# Patient Record
Sex: FEMALE | Race: WHITE | NOT HISPANIC OR LATINO | Employment: OTHER | URBAN - NONMETROPOLITAN AREA
[De-identification: names, ages, dates, MRNs, and addresses within clinical notes are randomized per-mention and may not be internally consistent; named-entity substitution may affect disease eponyms.]

---

## 2019-06-03 ENCOUNTER — OFFICE VISIT (OUTPATIENT)
Dept: CARDIOLOGY | Facility: CLINIC | Age: 79
End: 2019-06-03

## 2019-06-03 VITALS
HEIGHT: 63 IN | BODY MASS INDEX: 33.63 KG/M2 | DIASTOLIC BLOOD PRESSURE: 71 MMHG | HEART RATE: 68 BPM | OXYGEN SATURATION: 95 % | WEIGHT: 189.8 LBS | SYSTOLIC BLOOD PRESSURE: 139 MMHG

## 2019-06-03 DIAGNOSIS — I25.10 CORONARY ARTERY DISEASE INVOLVING NATIVE CORONARY ARTERY OF NATIVE HEART WITHOUT ANGINA PECTORIS: Primary | ICD-10-CM

## 2019-06-03 DIAGNOSIS — R00.2 PALPITATIONS: ICD-10-CM

## 2019-06-03 DIAGNOSIS — I42.0 CARDIOMYOPATHY, DILATED (HCC): ICD-10-CM

## 2019-06-03 DIAGNOSIS — R42 DIZZINESS: ICD-10-CM

## 2019-06-03 DIAGNOSIS — R06.02 SHORTNESS OF BREATH: ICD-10-CM

## 2019-06-03 DIAGNOSIS — E78.5 HYPERLIPIDEMIA, UNSPECIFIED HYPERLIPIDEMIA TYPE: ICD-10-CM

## 2019-06-03 DIAGNOSIS — I10 ESSENTIAL HYPERTENSION: ICD-10-CM

## 2019-06-03 PROBLEM — M19.90 ARTHRITIS: Status: ACTIVE | Noted: 2019-06-03

## 2019-06-03 PROBLEM — H40.9 GLAUCOMA: Status: ACTIVE | Noted: 2019-06-03

## 2019-06-03 PROCEDURE — 99204 OFFICE O/P NEW MOD 45 MIN: CPT | Performed by: NURSE PRACTITIONER

## 2019-06-03 PROCEDURE — 93000 ELECTROCARDIOGRAM COMPLETE: CPT | Performed by: NURSE PRACTITIONER

## 2019-06-03 RX ORDER — LATANOPROST 50 UG/ML
SOLUTION/ DROPS OPHTHALMIC TAKE AS DIRECTED
COMMUNITY
Start: 2019-03-17

## 2019-06-03 RX ORDER — ATORVASTATIN CALCIUM 40 MG/1
40 TABLET, FILM COATED ORAL DAILY
COMMUNITY
Start: 2019-04-08

## 2019-06-03 RX ORDER — LISINOPRIL 20 MG/1
20 TABLET ORAL DAILY
COMMUNITY
Start: 2019-05-28 | End: 2021-04-27 | Stop reason: ALTCHOICE

## 2019-06-03 RX ORDER — METOPROLOL SUCCINATE 50 MG/1
50 TABLET, EXTENDED RELEASE ORAL DAILY
COMMUNITY
Start: 2019-03-22 | End: 2020-02-10 | Stop reason: SDUPTHER

## 2019-06-03 RX ORDER — FUROSEMIDE 20 MG/1
20 TABLET ORAL DAILY
COMMUNITY
Start: 2019-05-06

## 2019-06-03 RX ORDER — ASPIRIN 81 MG/1
81 TABLET ORAL DAILY
COMMUNITY

## 2019-06-03 RX ORDER — VITAMIN B COMPLEX
CAPSULE ORAL DAILY
COMMUNITY

## 2019-06-03 NOTE — PROGRESS NOTES
Subjective   Kate Macias is a 79 y.o. female     Chief Complaint   Patient presents with   • Establish Care     Pt has hx of HTN and hyperlipidemia    • Hypertension       HPI    Problem list:    1.  CAD  1.1 left heart cath 4/2017 LVEF 20 to 25%, 60% napkin ring stenosis of the left circumflex  2.  Cardiomyopathy  2.1 echo 3/24/17-mitral regurgitation, moderate to severe, EF 20 to 25%  2.2 echo 10/16/18-LVEF 45 to 50%, mildly increased with severe left ventricular diastolic dysfunction, global hypokinesis, left atrium is mildly dilated, moderate posterior annular calcification the mitral valve, mild mitral regurgitation, RVSP is 20 mmHg, pericardial effusion of small size  3.  Hypertension  4.  Hyperlipidemia  5.  Palpitations  6.  Shortness of breath  7.  Arthritis  8.  Glaucoma  9.  Early family history of coronary artery disease    Patient is a 79-year-old female who presents today to establish care with her  at her side.  They moved here from [place] in January.  She denies any chest pain or pressure.  She says she does feel her heart race at times.  She says mostly whenever she exerts herself significantly.  She says that she does have dizziness/lightheadedness when she looks up also from position change.  She says she thinks is her neck issues that cause some of this.  Patient had a episode of syncope back in 2017 she does not know why she passed out but she was unconscious for 4 days when she came to they moved her from ICU and then she had to go to nursing home for a while.  This is when patient found that she had cardiomyopathy which is most likely she had a syncopal episode to possible V. tach or V. fib but I do not have any records to confirm this.  She has not had any further episodes.  She denies any orthopnea, PND or edema.  She says that she does have shortness of breath with exertion but again it is more than normal activity such as going up stairs or walking inclines.    Occupation:  Retired  at [employer]  Patient quit smoking when he 3 years old and smoked less than a pack a day for 7 to 8 years; no alcohol or illicit drugs  Occasionally drinks a diet Coke; 2 cups of coffee per day generally decaf, occasional ice tea    Mom 90 -COPD, legally blind due to macular degeneration, smoker  Dad 50 -car accident  Sister 81 alive-permanent pacemaker, borderline diabetes  Brother  before 50 due to MI  Brother 40 -degenerative disc disease and OD on drugs    Current Outpatient Medications   Medication Sig Dispense Refill   • aspirin 81 MG EC tablet Take 81 mg by mouth Daily.     • atorvastatin (LIPITOR) 40 MG tablet Take 40 mg by mouth Daily.     • B Complex Vitamins (VITAMIN B COMPLEX) capsule capsule Take  by mouth Daily.     • Calcium-Magnesium-Vitamin D (CITRACAL CALCIUM+D PO) Take  by mouth.     • Fexofenadine HCl (ALLEGRA ALLERGY PO) Take  by mouth.     • furosemide (LASIX) 20 MG tablet Take 20 mg by mouth Daily.     • latanoprost (XALATAN) 0.005 % ophthalmic solution Take As Directed. 1 drop in each eye, per day     • lisinopril (PRINIVIL,ZESTRIL) 20 MG tablet Take 20 mg by mouth Daily.     • MELATONIN PO Take  by mouth.     • metoprolol succinate XL (TOPROL-XL) 50 MG 24 hr tablet Take 50 mg by mouth Daily.     • Multiple Vitamins-Minerals (MULTIVITAMIN WOMEN 50+ PO) Take  by mouth.       No current facility-administered medications for this visit.        ALLERGIES    Patient has no known allergies.    Past Medical History:   Diagnosis Date   • Arthritis    • CHF (congestive heart failure) (CMS/[facility])    • DDD (degenerative disc disease), cervical    • DDD (degenerative disc disease), lumbar    • DDD (degenerative disc disease), thoracic    • Ejection fraction < 50%    • Enlarged heart    • Glaucoma    • Hyperlipidemia    • Hypertension    • Left ventricular hypertrophy due to hypertensive disease    • Neck pain      reverse curvature of spine in neck        Social History     Socioeconomic History   • Marital status:      Spouse name: Not on file   • Number of children: Not on file   • Years of education: Not on file   • Highest education level: Not on file   Tobacco Use   • Smoking status: Former Smoker     Packs/day: 0.50     Years: 15.00     Pack years: 7.50     Types: Cigarettes   • Smokeless tobacco: Never Used   Substance and Sexual Activity   • Alcohol use: No     Frequency: Never   • Drug use: No   • Sexual activity: Defer       Family History   Problem Relation Age of Onset   • COPD Mother    • Macular degeneration Mother    • Blindness Mother    • No Known Problems Father    • Heart attack Brother        Review of Systems   Constitutional: Positive for fatigue (States she's more tired now that she's older, but still climbs ladders and is active ). Negative for diaphoresis.   HENT: Positive for postnasal drip (States she has drainage all the time ). Negative for congestion, rhinorrhea and sore throat.    Eyes: Positive for visual disturbance (glasses daily ).   Respiratory: Positive for shortness of breath (Pt states she gets SoA w/ exertion and inclines ). Negative for chest tightness.    Cardiovascular: Positive for palpitations (will just feel it racing ). Negative for chest pain (Denies CP ) and leg swelling.   Gastrointestinal: Negative for abdominal pain, blood in stool, constipation, diarrhea, nausea and vomiting.   Endocrine: Negative for cold intolerance and heat intolerance.        Pt c/o hot flashes at night    Genitourinary: Positive for frequency (w/ water pill ), hematuria (States that blood can be found in her urine with a microscope. States no one knows where it's coming from and it's been there for a long time. ) and urgency (w/ water pill ). Negative for difficulty urinating and dysuria.   Musculoskeletal: Positive for arthralgias, back pain (DDD) and neck pain (reverse curvature of spine in  "neck ).   Skin: Positive for rash (getting over poison ivy). Negative for wound.   Allergic/Immunologic: Positive for environmental allergies (pollen ). Negative for food allergies.   Neurological: Positive for dizziness (look up and if she bends over and gets up quickly ), syncope (Feb 2018, she blacked out for 3-4 days. States she blacked out and hit her head on a washing machine. No episodes of blacking out since then; never figured out why she passed out) and light-headedness (occasionally. States if she looks up she gets light headed. States she thinsk it's from her neck issues ). Negative for weakness, numbness and headaches.   Hematological: Bruises/bleeds easily (bruises).   Psychiatric/Behavioral: Positive for sleep disturbance (Pt states she's up and down all night long. States it usually takes 2 melatonin to \"eventually\" get her to sleep. ).       Objective   /71   Pulse 68   Ht 160 cm (63\")   Wt 86.1 kg (189 lb 12.8 oz)   SpO2 95%   BMI 33.62 kg/m²   Vitals:    06/03/19 1514   BP: 139/71   Pulse: 68   SpO2: 95%   Weight: 86.1 kg (189 lb 12.8 oz)   Height: 160 cm (63\")      Lab Results (most recent)     None        Physical Exam   Constitutional: She is oriented to person, place, and time. Vital signs are normal. She appears well-developed and well-nourished. She is active and cooperative.   HENT:   Head: Normocephalic.   Eyes: Lids are normal.   Wears glasses   Neck: Normal carotid pulses, no hepatojugular reflux and no JVD present. Carotid bruit is not present.   Cardiovascular: Normal rate, regular rhythm and normal heart sounds.   Pulses:       Radial pulses are 2+ on the right side, and 2+ on the left side.        Dorsalis pedis pulses are 2+ on the right side, and 2+ on the left side.        Posterior tibial pulses are 2+ on the right side, and 2+ on the left side.   No edema bilateral lower extremities   Pulmonary/Chest: Effort normal and breath sounds normal.   Abdominal: Normal " appearance and bowel sounds are normal.   Neurological: She is alert and oriented to person, place, and time.   Skin: Skin is warm, dry and intact.   Psychiatric: She has a normal mood and affect. Her speech is normal and behavior is normal. Judgment and thought content normal. Cognition and memory are normal.       Procedure     ECG 12 Lead  Date/Time: 6/3/2019 3:40 PM  Performed by: Susan Rankin APRN  Authorized by: Susan Rankin APRN   Comparison: not compared with previous ECG   Rhythm: sinus rhythm  Rate: normal  BPM: 67  Conduction: non-specific intraventricular conduction delay  QRS axis: normal    Clinical impression: non-specific ECG                 Assessment/Plan      Diagnosis Plan   1. Coronary artery disease involving native coronary artery of native heart without angina pectoris  ECG 12 Lead    Adult Transthoracic Echo Complete W/ Cont if Necessary Per Protocol   2. Essential hypertension  ECG 12 Lead    Adult Transthoracic Echo Complete W/ Cont if Necessary Per Protocol   3. Hyperlipidemia, unspecified hyperlipidemia type     4. Palpitations  ECG 12 Lead    Adult Transthoracic Echo Complete W/ Cont if Necessary Per Protocol   5. Shortness of breath  Adult Transthoracic Echo Complete W/ Cont if Necessary Per Protocol   6. Cardiomyopathy, dilated (CMS/HCC)  Adult Transthoracic Echo Complete W/ Cont if Necessary Per Protocol   7. Dizziness  Duplex Carotid Ultrasound CAR       Return in about 12 weeks (around 8/26/2019).       CAD/hypertension/hyperlipidemia/palpitation/shortness of breath/cardiomyopathy-we will repeat echocardiogram.  Dizziness-I will also get a carotid artery ultrasound.  She will continue her medication regimen for now.  She will follow-up in 12 weeks or sooner if any changes.      Patient's Body mass index is 33.62 kg/m². BMI is above normal parameters. Recommendations include: educational material.      Electronically signed by:

## 2019-06-03 NOTE — PATIENT INSTRUCTIONS
MyPlate from USDA  MyPlate is an outline of a general healthy diet based on the 2010 Dietary Guidelines for Americans, from the U.S. Department of Agriculture (USDA). It sets guidelines for how much food you should eat from each food group based on your age, sex, and level of physical activity.  What are tips for following MyPlate?  To follow MyPlate recommendations:  · Eat a wide variety of fruits and vegetables, grains, and protein foods.  · Serve smaller portions and eat less food throughout the day.  · Limit portion sizes to avoid overeating.  · Enjoy your food.  · Get at least 150 minutes of exercise every week. This is about 30 minutes each day, 5 or more days per week.    It can be difficult to have every meal look like MyPlate. Think about MyPlate as eating guidelines for an entire day, rather than each individual meal.  Fruits and Vegetables  · Make half of your plate fruits and vegetables.  · Eat many different colors of fruits and vegetables each day.  · For a 2,000 calorie daily food plan, eat:  ? 2½ cups of vegetables every day.  ? 2 cups of fruit every day.  · 1 cup is equal to:  ? 1 cup raw or cooked vegetables.  ? 1 cup raw fruit.  ? 1 medium-sized orange, apple, or banana.  ? 1 cup 100% fruit or vegetable juice.  ? 2 cups raw leafy greens, such as lettuce, spinach, or kale.  ? ½ cup dried fruit.  Grains  · One fourth of your plate should be grains.  · Make at least half of the grains you eat each day whole grains.  · For a 2,000 calorie daily food plan, eat 6 oz of grains every day.  · 1 oz is equal to:  ? 1 slice bread.  ? 1 cup cereal.  ? ½ cup cooked rice, cereal, or pasta.  Protein  · One fourth of your plate should be protein.  · Eat a wide variety of protein foods, including meat, poultry, fish, eggs, beans, nuts, and tofu.  · For a 2,000 calorie daily food plan, eat 5½ oz of protein every day.  · 1 oz is equal to:  ? 1 oz meat, poultry, or fish.  ? ¼ cup cooked beans.  ? 1 egg.  ? ½ oz nuts  or seeds.  ? 1 Tbsp peanut butter.  Dairy  · Drink fat-free or low-fat (1%) milk.  · Eat or drink dairy as a side to meals.  · For a 2,000 calorie daily food plan, eat or drink 3 cups of dairy every day.  · 1 cup is equal to:  ? 1 cup milk, yogurt, cottage cheese, or soy milk (soy beverage).  ? 2 oz processed cheese.  ? 1½ oz natural cheese.  Fats, oils, salt, and sugars  · Only small amounts of oils are recommended.  · Avoid foods that are high in calories and low in nutritional value (empty calories), like foods high in fat or added sugars.  · Choose foods that are low in salt (sodium). Choose foods that have less than 140 milligrams (mg) of sodium per serving.  · Drink water instead of sugary drinks. Drink enough water each day to keep your urine pale yellow.  Where to find support  · Work with your health care provider or a nutrition specialist (dietitian) to develop a customized eating plan that is right for you.  · Download an hugh (mobile application) to help you track your daily food intake.  Where to find more information  · Go to ChooseMyPlate.gov for more information.  · Learn more and log your daily food intake according to MyPlate using USDA's Mytonomycker: www.Ti-[employer] Technologyer.usda.gov  Summary  · MyPlate is a general guideline for healthy eating from the USDA. It is based on the 2010 Dietary Guidelines for Americans.  · In general, fruits and vegetables should take up ½ of your plate, grains should take up ¼ of your plate, and protein should take up ¼ of your plate.  This information is not intended to replace advice given to you by your health care provider. Make sure you discuss any questions you have with your health care provider.  Document Released: 01/06/2009 Document Revised: 03/19/2018 Document Reviewed: 03/19/2018  [employer] Interactive Patient Education © 2019 [employer]  Cardiomyopathy, Adult  Cardiomyopathy is a long-term (chronic) disease of the heart muscle. The disease makes the heart  muscle thick, weak, or stiff. As a result, the heart works harder to pump blood. Over time cardiomyopathy can lead to an irregular heartbeat (arrhythmia) and heart failure.  There are several types of cardiomyopathy:  · Dilated cardiomyopathy. This type causes the ventricles to become weak and stretched out.  · Hypertrophic cardiomyopathy. This type causes the heart muscle to thicken.  · Restrictive cardiomyopathy. This type causes the heart muscle to become stiff.  · Ischemic cardiomyopathy. This type involves narrowing arteries that cause the walls of the heart to get thinner.  · Peripartum cardiomyopathy. This type occurs during pregnancy or shortly after pregnancy.    What are the causes?  This condition may be caused by:  · A gene that is passed down (inherited) from a family member.  · A medical condition that damages the heart, such as:  ? Diabetes.  ? High blood pressure.  ? Viral infection of the heart.  ? Heart attack.  ? Coronary heart disease.  · Alcoholism.  · Using illegal drugs or some prescription medicines.  · Pregnancy.  · Your body absorbing and storing too much iron (hemochromatosis).  · Autoimmune diseases, connective tissue diseases, endocrine diseases, and muscle diseases.  · Cancer treatments.  · Buildup of proteins in your organs (amyloidosis), or inflammation in your organs (sarcoidosis).    Often, the cause is not known.  What increases the risk?  This condition is more likely to develop in people who:  · Have a family history of cardiomyopathy or other heart problems.  · Are overweight or obese.  · Use illegal drugs.  · Abuse alcohol.  · Have a medical condition that damages the heart.    What are the signs or symptoms?  Symptoms of this condition include:  · Shortness of breath, especially during activity.  · Fatigue.  · An irregular heartbeat and heart murmurs.  · Dizziness.  · Light-headedness.  · Fainting.  · Chest pain.  · Coughing.  · Swelling in the lower legs, ankles, feet,  abdomen, and neck veins.    Often, people with this condition have no symptoms.  How is this diagnosed?  This condition is diagnosed based on:  · Your symptoms and medical history.  · A physical exam.  · Tests.    Tests may include:  · Blood tests.  · Imaging studies of your heart, such as:  ? X-rays.  ? An echocardiogram.  ? An MRI.  · An electrocardiogram (ECG). This records your heart's electrical activity.  · A test in which you wear a portable device (event monitor) to record your heart's electrical activity while you go about your day.  · A stress test. This monitors your heart's activity while exercising.  · Cardiac catheterization. This procedure checks the blood pressure and blood flow in your heart.  · An angiogram. This is an injection of dye into your arteries before imaging studies are taken.  · Heart tissue biopsy. This removes a sample of heart tissue for examination.    How is this treated?  Treatment for this condition depends on the type of cardiomyopathy you have and the severity of your symptoms. If you do not have symptoms, you may not need treatment. If you need treatment, it may include:  · Lifestyle changes, such as:  ? Eating a heart-healthy diet that includes plenty of fruits, vegetables, and whole grains, and cutting down on salt (sodium).  ? Maintaining a healthy weight, and losing weight, if needed.  ? Getting regular exercise.  ? Quitting smoking, if you smoke.  ? Avoiding alcohol.  ? Medicine to:  § Lower your blood pressure.  § Slow down your heart rate.  § Keep your heart beating in a steady rhythm.  § Clear excess fluids from your body.  § Prevent blood clots.  § Balance minerals (electrolytes) in your body and get rid of extra sodium in your body.  § Reduce inflammation.  § Strengthen your heartbeat.  ? Surgery to:  § Repair a defect.  § Remove thickened tissue.  § Destroy tissues in the area of abnormal electrical activity (ablation).  § Implant a device to treat serious heart  rhythm problems (implantable cardioverter-defibrillator, or ICD), or a pacemaker.  § Replace your heart (heart transplant) if all other treatments have failed (end stage).    Other treatments may include cardiac resynchronization therapy (CRT) or a left ventricular assist device (LVAD).  Follow these instructions at home:  Lifestyle  · Eat a heart-healthy diet. Work with your health care provider or a registered dietitian to learn about healthy eating options.  · Maintain a healthy weight.  · Stay physically active. Ask your health care provider to suggest some activities that are good for you.  · Do not use any products that contain nicotine or tobacco, such as cigarettes and e-cigarettes. If you need help quitting, ask your health care provider.  · Limit alcohol intake to no more than one drink per day for nonpregnant women and no more than two drinks per day for men. One drink equals 12 oz of beer, 5 oz of wine, or 1½ oz of hard liquor.  · Try to get at least 7 hours of sleep each night.  · Find healthy ways to manage stress.  General instructions  · Take over-the-counter and prescription medicines only as told by your health care provider. Some medicines can be dangerous for your heart.  · Tell all health care providers, including your dentist, that you have cardiomyopathy. When you visit the dentist or have surgery, ask your health care provider if you need antibiotics before having dental care or before the surgery.  · Ask your health care provider if you should wear a medical identification bracelet. This may be important if you have a pacemaker or a defibrillator.  · Make sure you get all recommended vaccinations and an annual flu shot.  · Work closely with your health care provider to manage any long-lasting (chronic) conditions.  · Keep all follow-up visits as told by your health care provider. This is important, even if you do not have any symptoms. Your health care provider may need to make sure your  condition is not getting worse.  How is this prevented?  This condition cannot be prevented. Parents, siblings, and children of people with this condition may be at risk for the condition. It is a good idea for them to get screened for the condition because it is best when cardiomyopathy is found early. Screening is done with an ECG and echocardiogram.  People who want to start a family may also want to meet with a genetic counselor to discuss the risk of having a child with cardiomyopathy.  Contact a health care provider if:  · Your symptoms get worse.  · You have new symptoms.  Get help right away if:  · You have severe chest pain.  · You have shortness of breath.  · You cough up pink, bubbly material.  · You have sudden sweating.  · You feel nauseous and you vomit.  · You suddenly become light-headed or dizzy.  · You feel your heart beating very quickly.  · It feels like your heart is skipping beats.  These symptoms may represent a serious problem that is an emergency. Do not wait to see if the symptoms will go away. Get medical help right away. Call your local emergency services (911 in the [place] Do not drive yourself to the hospital.  Summary  · Cardiomyopathy is a long-term (chronic) disease of the heart muscle.  · Over time cardiomyopathy can lead to an irregular heartbeat (arrhythmia) and heart failure.  This information is not intended to replace advice given to you by your health care provider. Make sure you discuss any questions you have with your health care provider.  Document Released: 03/02/2006 Document Revised: 03/09/2018 Document Reviewed: 06/19/2017  [employer] Interactive Patient Education © 2019 [employer]

## 2019-06-04 ENCOUNTER — TELEPHONE (OUTPATIENT)
Dept: CARDIOLOGY | Facility: CLINIC | Age: 79
End: 2019-06-04

## 2019-06-04 NOTE — TELEPHONE ENCOUNTER
----- Message from BRISA Valdivia sent at 6/3/2019  5:46 PM EDT -----  Can you call [facility] [place] and [facility] records from 2017 to current.  Thanks!

## 2019-06-06 NOTE — TELEPHONE ENCOUNTER
Called [facility] MR dept at [phone], was told to fax a request to [phone].       Faxed request for records from 2017- now.       Awaiting fax...

## 2019-06-18 ENCOUNTER — HOSPITAL ENCOUNTER (OUTPATIENT)
Dept: CARDIOLOGY | Facility: HOSPITAL | Age: 79
Discharge: HOME | End: 2019-06-18

## 2019-06-18 ENCOUNTER — HOSPITAL ENCOUNTER (OUTPATIENT)
Dept: CARDIOLOGY | Facility: HOSPITAL | Age: 79
Discharge: HOME | End: 2019-06-18
Admitting: NURSE PRACTITIONER

## 2019-06-18 VITALS — WEIGHT: 189.82 LBS | HEIGHT: 63 IN | BODY MASS INDEX: 33.63 KG/M2

## 2019-06-18 DIAGNOSIS — I42.0 CARDIOMYOPATHY, DILATED (HCC): ICD-10-CM

## 2019-06-18 DIAGNOSIS — R42 DIZZINESS: ICD-10-CM

## 2019-06-18 DIAGNOSIS — R06.02 SHORTNESS OF BREATH: ICD-10-CM

## 2019-06-18 DIAGNOSIS — I25.10 CORONARY ARTERY DISEASE INVOLVING NATIVE CORONARY ARTERY OF NATIVE HEART WITHOUT ANGINA PECTORIS: ICD-10-CM

## 2019-06-18 DIAGNOSIS — R00.2 PALPITATIONS: ICD-10-CM

## 2019-06-18 DIAGNOSIS — I10 ESSENTIAL HYPERTENSION: ICD-10-CM

## 2019-06-18 PROCEDURE — 93306 TTE W/DOPPLER COMPLETE: CPT

## 2019-06-18 PROCEDURE — 93880 EXTRACRANIAL BILAT STUDY: CPT | Performed by: INTERNAL MEDICINE

## 2019-06-18 PROCEDURE — 93880 EXTRACRANIAL BILAT STUDY: CPT

## 2019-06-18 PROCEDURE — 93306 TTE W/DOPPLER COMPLETE: CPT | Performed by: INTERNAL MEDICINE

## 2019-06-24 ENCOUNTER — TELEPHONE (OUTPATIENT)
Dept: CARDIOLOGY | Facility: CLINIC | Age: 79
End: 2019-06-24

## 2019-06-24 LAB
BH CV ECHO MEAS - ACS: 2 CM
BH CV ECHO MEAS - AO MAX PG: 6.5 MMHG
BH CV ECHO MEAS - AO MEAN PG: 3.4 MMHG
BH CV ECHO MEAS - AO ROOT AREA (BSA CORRECTED): 1.5
BH CV ECHO MEAS - AO ROOT AREA: 6.3 CM^2
BH CV ECHO MEAS - AO ROOT DIAM: 2.8 CM
BH CV ECHO MEAS - AO V2 MAX: 127.5 CM/SEC
BH CV ECHO MEAS - AO V2 MEAN: 86.9 CM/SEC
BH CV ECHO MEAS - AO V2 VTI: 28.3 CM
BH CV ECHO MEAS - BSA(HAYCOCK): 2 M^2
BH CV ECHO MEAS - BSA(HAYCOCK): 2 M^2
BH CV ECHO MEAS - BSA: 1.9 M^2
BH CV ECHO MEAS - BSA: 1.9 M^2
BH CV ECHO MEAS - BZI_BMI: 33.5 KILOGRAMS/M^2
BH CV ECHO MEAS - BZI_BMI: 33.5 KILOGRAMS/M^2
BH CV ECHO MEAS - BZI_METRIC_HEIGHT: 160 CM
BH CV ECHO MEAS - BZI_METRIC_HEIGHT: 160 CM
BH CV ECHO MEAS - BZI_METRIC_WEIGHT: 85.7 KG
BH CV ECHO MEAS - BZI_METRIC_WEIGHT: 85.7 KG
BH CV ECHO MEAS - EDV(CUBED): 134.1 ML
BH CV ECHO MEAS - EDV(MOD-SP4): 100.4 ML
BH CV ECHO MEAS - EDV(TEICH): 124.9 ML
BH CV ECHO MEAS - EF(CUBED): 52.8 %
BH CV ECHO MEAS - EF(MOD-SP4): 56.9 %
BH CV ECHO MEAS - EF(TEICH): 44.4 %
BH CV ECHO MEAS - ESV(CUBED): 63.3 ML
BH CV ECHO MEAS - ESV(MOD-SP4): 43.3 ML
BH CV ECHO MEAS - ESV(TEICH): 69.4 ML
BH CV ECHO MEAS - FS: 22.1 %
BH CV ECHO MEAS - IVS/LVPW: 1
BH CV ECHO MEAS - IVSD: 1.2 CM
BH CV ECHO MEAS - LA DIMENSION(2D): 3.8 CM
BH CV ECHO MEAS - LA DIMENSION: 3.6 CM
BH CV ECHO MEAS - LA/AO: 1.3
BH CV ECHO MEAS - LAT PEAK E' VEL: 2 CM/SEC
BH CV ECHO MEAS - LV DIASTOLIC VOL/BSA (35-75): 53.2 ML/M^2
BH CV ECHO MEAS - LV IVRT: 0.11 SEC
BH CV ECHO MEAS - LV MASS(C)D: 234.9 GRAMS
BH CV ECHO MEAS - LV MASS(C)DI: 124.4 GRAMS/M^2
BH CV ECHO MEAS - LV SYSTOLIC VOL/BSA (12-30): 22.9 ML/M^2
BH CV ECHO MEAS - LVIDD: 5.1 CM
BH CV ECHO MEAS - LVIDS: 4 CM
BH CV ECHO MEAS - LVPWD: 1.2 CM
BH CV ECHO MEAS - MED PEAK E' VEL: 2 CM/SEC
BH CV ECHO MEAS - MITRAL HR: 99.7 BPM
BH CV ECHO MEAS - MITRAL R-R: 0.6 SEC
BH CV ECHO MEAS - MR MAX PG: 79.9 MMHG
BH CV ECHO MEAS - MR MAX VEL: 438.4 CM/SEC
BH CV ECHO MEAS - MV A MAX VEL: 114.6 CM/SEC
BH CV ECHO MEAS - MV DEC SLOPE: 228.6 CM/SEC^2
BH CV ECHO MEAS - MV DEC TIME: 0.27 SEC
BH CV ECHO MEAS - MV E MAX VEL: 61.1 CM/SEC
BH CV ECHO MEAS - MV E/A: 0.53
BH CV ECHO MEAS - MV MAX PG: 5.2 MMHG
BH CV ECHO MEAS - MV MEAN PG: 2.1 MMHG
BH CV ECHO MEAS - MV P1/2T: 83 MSEC
BH CV ECHO MEAS - MV V2 MAX: 113.6 CM/SEC
BH CV ECHO MEAS - MV V2 MEAN: 66.4 CM/SEC
BH CV ECHO MEAS - MV V2 VTI: 39.5 CM
BH CV ECHO MEAS - MVA(P1/2T): 2.6 CM2
BH CV ECHO MEAS - PA MAX PG: 3.8 MMHG
BH CV ECHO MEAS - PA MEAN PG: 1.9 MMHG
BH CV ECHO MEAS - PA V2 MAX: 97.1 CM/SEC
BH CV ECHO MEAS - PA V2 MEAN: 63.2 CM/SEC
BH CV ECHO MEAS - PA V2 VTI: 21.2 CM
BH CV ECHO MEAS - PULM. HR: 178.8 BPM
BH CV ECHO MEAS - PULM. R-R: 0.34 SEC
BH CV ECHO MEAS - RAP SYSTOLE: 10 MMHG
BH CV ECHO MEAS - RVDD: 2.6 CM
BH CV ECHO MEAS - RVSP: 23 MMHG
BH CV ECHO MEAS - SI(AO): 94.5 ML/M^2
BH CV ECHO MEAS - SI(CUBED): 37.5 ML/M^2
BH CV ECHO MEAS - SI(MOD-SP4): 30.2 ML/M^2
BH CV ECHO MEAS - SI(TEICH): 29.4 ML/M^2
BH CV ECHO MEAS - SV(AO): 178.3 ML
BH CV ECHO MEAS - SV(CUBED): 70.8 ML
BH CV ECHO MEAS - SV(MOD-SP4): 57.1 ML
BH CV ECHO MEAS - SV(TEICH): 55.4 ML
BH CV ECHO MEAS - TR MAX VEL: 179.8 CM/SEC
BH CV ECHO MEASUREMENTS AVERAGE E/E' RATIO: 30.55
BH CV XLRA MEAS LEFT BULB EDV: 18.7 CM/SEC
BH CV XLRA MEAS LEFT BULB PSV: 82 CM/SEC
BH CV XLRA MEAS LEFT CCA RATIO VEL: 66.3 CM/SEC
BH CV XLRA MEAS LEFT DIST CCA EDV: 11.5 CM/SEC
BH CV XLRA MEAS LEFT DIST CCA PSV: 66.3 CM/SEC
BH CV XLRA MEAS LEFT DIST ICA EDV: -31.4 CM/SEC
BH CV XLRA MEAS LEFT DIST ICA PSV: -109 CM/SEC
BH CV XLRA MEAS LEFT ICA RATIO VEL: -109 CM/SEC
BH CV XLRA MEAS LEFT ICA/CCA RATIO: -1.6
BH CV XLRA MEAS LEFT MID CCA EDV: 18.1 CM/SEC
BH CV XLRA MEAS LEFT MID CCA PSV: 89.9 CM/SEC
BH CV XLRA MEAS LEFT MID ICA EDV: -24.7 CM/SEC
BH CV XLRA MEAS LEFT MID ICA PSV: -88.1 CM/SEC
BH CV XLRA MEAS LEFT PROX CCA EDV: 18.7 CM/SEC
BH CV XLRA MEAS LEFT PROX CCA PSV: 99.5 CM/SEC
BH CV XLRA MEAS LEFT PROX ECA EDV: 0 CM/SEC
BH CV XLRA MEAS LEFT PROX ECA PSV: -67.5 CM/SEC
BH CV XLRA MEAS LEFT PROX ICA EDV: 22.3 CM/SEC
BH CV XLRA MEAS LEFT PROX ICA PSV: 79 CM/SEC
BH CV XLRA MEAS LEFT VERTEBRAL A EDV: 13.3 CM/SEC
BH CV XLRA MEAS LEFT VERTEBRAL A PSV: 63.3 CM/SEC
BH CV XLRA MEAS RIGHT BULB EDV: 11 CM/SEC
BH CV XLRA MEAS RIGHT BULB PSV: 55.4 CM/SEC
BH CV XLRA MEAS RIGHT CCA RATIO VEL: 73.9 CM/SEC
BH CV XLRA MEAS RIGHT DIST CCA EDV: 16.3 CM/SEC
BH CV XLRA MEAS RIGHT DIST CCA PSV: 73.9 CM/SEC
BH CV XLRA MEAS RIGHT DIST ICA EDV: 14.3 CM/SEC
BH CV XLRA MEAS RIGHT DIST ICA PSV: 60.6 CM/SEC
BH CV XLRA MEAS RIGHT ICA RATIO VEL: -61.1 CM/SEC
BH CV XLRA MEAS RIGHT ICA/CCA RATIO: -0.83
BH CV XLRA MEAS RIGHT MID CCA EDV: 15.5 CM/SEC
BH CV XLRA MEAS RIGHT MID CCA PSV: 73.9 CM/SEC
BH CV XLRA MEAS RIGHT MID ICA EDV: -12.4 CM/SEC
BH CV XLRA MEAS RIGHT MID ICA PSV: -59.2 CM/SEC
BH CV XLRA MEAS RIGHT PROX CCA EDV: 15.5 CM/SEC
BH CV XLRA MEAS RIGHT PROX CCA PSV: 82.5 CM/SEC
BH CV XLRA MEAS RIGHT PROX ECA EDV: 0 CM/SEC
BH CV XLRA MEAS RIGHT PROX ECA PSV: -72.1 CM/SEC
BH CV XLRA MEAS RIGHT PROX ICA EDV: -13.8 CM/SEC
BH CV XLRA MEAS RIGHT PROX ICA PSV: -61.1 CM/SEC
BH CV XLRA MEAS RIGHT VERTEBRAL A EDV: 8.1 CM/SEC
BH CV XLRA MEAS RIGHT VERTEBRAL A PSV: 45.8 CM/SEC
MAXIMAL PREDICTED HEART RATE: 141 BPM
STRESS TARGET HR: 120 BPM

## 2019-06-24 NOTE — TELEPHONE ENCOUNTER
----- Message from BRISA Valdivia sent at 6/24/2019  7:02 AM EDT -----  Patient's EF has gone down slightly, can just be visual or truly declined.  Would she want to have a stress test to see if any possible ischemia for slightly decreased EF?  She is on appropriate meds.

## 2019-06-24 NOTE — TELEPHONE ENCOUNTER
Informed pt of her results, she stated that her EF use to be around 20%, so it's much better. I informed pt that her last echo showed EF of 45-50%, so it seems it may have gone down some.   Pt stated that she would think about it and call back.

## 2019-06-24 NOTE — TELEPHONE ENCOUNTER
Echo:  Estimated EF appears to be in the range of 41 - 45%.       Duplex Carotid:  1.  Both common carotid arteries are widely patent.  2.  There is mild bifurcation disease bilaterally, somewhat more pronounced on the left where mild eccentric calcified plaque is identified, with 16 to 49% stenosis bilaterally.     3.  The right internal carotid artery is without obstruction by echo or Doppler sampling.  16 to 49% stenosis in the mid and distal left internal carotid artery.     4.  Antegrade flow is demonstrated in both vertebral arteries.     Summary: Nonobstructive carotid disease bilaterally as above.  Antegrade flow in both vertebral arteries.      Follow up on 9/11/19.

## 2019-09-11 ENCOUNTER — OFFICE VISIT (OUTPATIENT)
Dept: CARDIOLOGY | Facility: CLINIC | Age: 79
End: 2019-09-11

## 2019-09-11 VITALS
SYSTOLIC BLOOD PRESSURE: 140 MMHG | HEIGHT: 63 IN | OXYGEN SATURATION: 97 % | BODY MASS INDEX: 33.91 KG/M2 | WEIGHT: 191.4 LBS | HEART RATE: 67 BPM | DIASTOLIC BLOOD PRESSURE: 71 MMHG

## 2019-09-11 DIAGNOSIS — E78.5 HYPERLIPIDEMIA, UNSPECIFIED HYPERLIPIDEMIA TYPE: ICD-10-CM

## 2019-09-11 DIAGNOSIS — I10 ESSENTIAL HYPERTENSION: ICD-10-CM

## 2019-09-11 DIAGNOSIS — I65.23 BILATERAL CAROTID ARTERY STENOSIS: ICD-10-CM

## 2019-09-11 DIAGNOSIS — R06.02 SHORTNESS OF BREATH: ICD-10-CM

## 2019-09-11 DIAGNOSIS — I25.10 CORONARY ARTERY DISEASE INVOLVING NATIVE CORONARY ARTERY OF NATIVE HEART WITHOUT ANGINA PECTORIS: Primary | ICD-10-CM

## 2019-09-11 DIAGNOSIS — R00.2 PALPITATIONS: ICD-10-CM

## 2019-09-11 DIAGNOSIS — I42.0 CARDIOMYOPATHY, DILATED (HCC): ICD-10-CM

## 2019-09-11 PROCEDURE — 99214 OFFICE O/P EST MOD 30 MIN: CPT | Performed by: NURSE PRACTITIONER

## 2019-09-11 RX ORDER — POTASSIUM CHLORIDE 750 MG/1
1 TABLET, FILM COATED, EXTENDED RELEASE ORAL DAILY
Refills: 1 | COMMUNITY
Start: 2019-08-06

## 2019-09-11 NOTE — PATIENT INSTRUCTIONS
"Fat and Cholesterol Restricted Eating Plan  Getting too much fat and cholesterol in your diet may cause health problems. Choosing the right foods helps keep your fat and cholesterol at normal levels. This can keep you from getting certain diseases.  Your doctor may recommend an eating plan that includes:  · Total fat: ______% or less of total calories a day.  · Saturated fat: ______% or less of total calories a day.  · Cholesterol: less than _________mg a day.  · Fiber: ______g a day.  What are tips for following this plan?  General tips    · Work with your doctor to lose weight if you need to.  · Avoid:  ? Foods with added sugar.  ? Fried foods.  ? Foods with partially hydrogenated oils.  · Limit alcohol intake to no more than 1 drink a day for nonpregnant women and 2 drinks a day for men. One drink equals 12 oz of beer, 5 oz of wine, or 1½ oz of hard liquor.  Reading food labels  · Check food labels for:  ? Trans fats.  ? Partially hydrogenated oils.  ? Saturated fat (g) in each serving.  ? Cholesterol (mg) in each serving.  ? Fiber (g) in each serving.  · Choose foods with healthy fats, such as:  ? Monounsaturated fats.  ? Polyunsaturated fats.  ? Omega-3 fats.  · Choose grain products that have whole grains. Look for the word \"whole\" as the first word in the ingredient list.  Cooking  · Cook foods using low-fat methods. These include baking, boiling, grilling, and broiling.  · Eat more home-cooked foods. Eat at restaurants and buffets less often.  · Avoid cooking using saturated fats, such as butter, cream, palm oil, palm kernel oil, and coconut oil.  Meal planning    · At meals, divide your plate into four equal parts:  ? Fill one-half of your plate with vegetables and green salads.  ? Fill one-fourth of your plate with whole grains.  ? Fill one-fourth of your plate with low-fat (lean) protein foods.  · Eat fish that is high in omega-3 fats at least two times a week. This includes mackerel, tuna, sardines, and " salmon.  · Eat foods that are high in fiber, such as whole grains, beans, apples, broccoli, carrots, peas, and barley.  Recommended foods  Grains  · Whole grains, such as whole wheat or whole grain breads, crackers, cereals, and pasta. Unsweetened oatmeal, bulgur, barley, quinoa, or brown rice. Corn or whole wheat flour tortillas.  Vegetables  · Fresh or frozen vegetables (raw, steamed, roasted, or grilled). Green salads.  Fruits  · All fresh, canned (in natural juice), or frozen fruits.  Meats and other protein foods  · Ground beef (85% or leaner), grass-fed beef, or beef trimmed of fat. Skinless chicken or turkey. Ground chicken or turkey. Pork trimmed of fat. All fish and seafood. Egg whites. Dried beans, peas, or lentils. Unsalted nuts or seeds. Unsalted canned beans. Nut butters without added sugar or oil.  Dairy  · Low-fat or nonfat dairy products, such as skim or 1% milk, 2% or reduced-fat cheeses, low-fat and fat-free ricotta or cottage cheese, or plain low-fat and nonfat yogurt.  Fats and oils  · Tub margarine without trans fats. Light or reduced-fat mayonnaise and salad dressings. Avocado. Olive, canola, sesame, or safflower oils.  The items listed above may not be a complete list of recommended foods or beverages. Contact your dietitian for more options.  The items listed above may not be a complete list of foods and beverages [you/your child] can eat. Contact a dietitian for more information.  Foods to avoid  Grains  · White bread. White pasta. White rice. Cornbread. Bagels, pastries, and croissants. Crackers and snack foods that contain trans fat and hydrogenated oils.  Vegetables  · Vegetables cooked in cheese, cream, or butter sauce. Fried vegetables.  Fruits  · Canned fruit in heavy syrup. Fruit in cream or butter sauce. Fried fruit.  Meats and other protein foods  · Fatty cuts of meat. Ribs, chicken wings, hamm, sausage, bologna, salami, chitterlings, fatback, hot dogs, bratwurst, and packaged  lunch meats. Liver and organ meats. Whole eggs and egg yolks. Chicken and turkey with skin. Fried meat.  Dairy  · Whole or 2% milk, cream, half-and-half, and cream cheese. Whole milk cheeses. Whole-fat or sweetened yogurt. Full-fat cheeses. Nondairy creamers and whipped toppings. Processed cheese, cheese spreads, and cheese curds.  Beverages  · Alcohol. Sugar-sweetened drinks such as sodas, lemonade, and fruit drinks.  Fats and oils  · Butter, stick margarine, lard, shortening, ghee, or hamm fat. Coconut, palm kernel, and palm oils.  Sweets and desserts  · Corn syrup, sugars, honey, and molasses. Candy. Jam and jelly. Syrup. Sweetened cereals. Cookies, pies, cakes, donuts, muffins, and ice cream.  The items listed above may not be a complete list of foods and beverages to avoid. Contact your dietitian for more information.  The items listed above may not be a complete list of foods and beverages [you/your child] should avoid. Contact a dietitian for more information.  Summary  · Choosing the right foods helps keep your fat and cholesterol at normal levels. This can keep you from getting certain diseases.  · At meals, fill one-half of your plate with vegetables and green salads.  · Eat high-fiber foods, like whole grains, beans, apples, carrots, peas, and barley.  · Limit added sugar, saturated fats, alcohol, and fried foods.  This information is not intended to replace advice given to you by your health care provider. Make sure you discuss any questions you have with your health care provider.  Document Released: 06/18/2013 Document Revised: 09/04/2018 Document Reviewed: 09/04/2018  [employer] Patient Education © 2019 [employer]  BMI for Adults    Body mass index (BMI) is a number that is calculated from a person's weight and height. BMI may help to estimate how much of a person's weight is composed of fat. BMI can help identify those who may be at higher risk for certain medical problems.  How is BMI  "used with adults?  BMI is used as a screening tool to identify possible weight problems. It is used to check whether a person is obese, overweight, healthy weight, or underweight.  How is BMI calculated?  BMI measures your weight and compares it to your height. This can be done either in English ([place] or metric measurements. Note that charts are available to help you find your BMI quickly and easily without having to do these calculations yourself.  To calculate your BMI in English ([place] measurements, your health care provider will:  1. Measure your weight in pounds (lb).  2. Multiply the number of pounds by 703.  ? For example, for a person who weighs 180 lb, multiply that number by 703, which equals 126,540.  3. Measure your height in inches (in). Then multiply that number by itself to get a measurement called \"inches squared.\"  ? For example, for a person who is 70 in tall, the \"inches squared\" measurement is 70 in x 70 in, which equals 4900 inches squared.  4. Divide the total from Step 2 (number of lb x 703) by the total from Step 3 (inches squared): 126,540 ÷ 4900 = 25.8. This is your BMI.  To calculate your BMI in metric measurements, your health care provider will:  1. Measure your weight in kilograms (kg).  2. Measure your height in meters (m). Then multiply that number by itself to get a measurement called \"meters squared.\"  ? For example, for a person who is 1.75 m tall, the \"meters squared\" measurement is 1.75 m x 1.75 m, which is equal to 3.1 meters squared.  3. Divide the number of kilograms (your weight) by the meters squared number. In this example: 70 ÷ 3.1 = 22.6. This is your BMI.  How is BMI interpreted?  To interpret your results, your health care provider will use BMI charts to identify whether you are underweight, normal weight, overweight, or obese. The following guidelines will be used:  · Underweight: BMI less than 18.5.  · Normal weight: BMI between 18.5 and 24.9.  · Overweight: BMI " between 25 and 29.9.  · Obese: BMI of 30 and above.  Please note:  · Weight includes both fat and muscle, so someone with a muscular build, such as an athlete, may have a BMI that is higher than 24.9. In cases like these, BMI is not an accurate measure of body fat.  · To determine if excess body fat is the cause of a BMI of 25 or higher, further assessments may need to be done by a health care provider.  · BMI is usually interpreted in the same way for men and women.  Why is BMI a useful tool?  BMI is useful in two ways:  · Identifying a weight problem that may be related to a medical condition, or that may increase the risk for medical problems.  · Promoting lifestyle and diet changes in order to reach a healthy weight.  Summary  · Body mass index (BMI) is a number that is calculated from a person's weight and height.  · BMI may help to estimate how much of a person's weight is composed of fat. BMI can help identify those who may be at higher risk for certain medical problems.  · BMI can be measured using English measurements or metric measurements.  · To interpret your results, your health care provider will use BMI charts to identify whether you are underweight, normal weight, overweight, or obese.  This information is not intended to replace advice given to you by your health care provider. Make sure you discuss any questions you have with your health care provider.  Document Released: 08/29/2005 Document Revised: 10/31/2018 Document Reviewed: 10/31/2018  [employer] Interactive Patient Education © 2019 [employer]

## 2020-02-10 RX ORDER — METOPROLOL SUCCINATE 50 MG/1
50 TABLET, EXTENDED RELEASE ORAL DAILY
Qty: 90 TABLET | Refills: 3 | Status: SHIPPED | OUTPATIENT
Start: 2020-02-10 | End: 2021-01-25

## 2020-02-10 NOTE — TELEPHONE ENCOUNTER
Pt LVM stating that she needs Rf of Metoprolol ER 50mg x90 to [facility], stated that it was orgiginally prescribed by a cardiologist in [place].

## 2020-05-21 ENCOUNTER — OFFICE VISIT (OUTPATIENT)
Dept: CARDIOLOGY | Facility: CLINIC | Age: 80
End: 2020-05-21

## 2020-05-21 VITALS
HEART RATE: 70 BPM | SYSTOLIC BLOOD PRESSURE: 137 MMHG | HEIGHT: 63 IN | BODY MASS INDEX: 35.44 KG/M2 | WEIGHT: 200 LBS | DIASTOLIC BLOOD PRESSURE: 76 MMHG | TEMPERATURE: 98.4 F | OXYGEN SATURATION: 98 %

## 2020-05-21 DIAGNOSIS — R06.02 SHORTNESS OF BREATH: ICD-10-CM

## 2020-05-21 DIAGNOSIS — I10 ESSENTIAL HYPERTENSION: ICD-10-CM

## 2020-05-21 DIAGNOSIS — I65.23 BILATERAL CAROTID ARTERY STENOSIS: ICD-10-CM

## 2020-05-21 DIAGNOSIS — I25.10 CORONARY ARTERY DISEASE INVOLVING NATIVE CORONARY ARTERY OF NATIVE HEART WITHOUT ANGINA PECTORIS: Primary | ICD-10-CM

## 2020-05-21 DIAGNOSIS — I42.0 CARDIOMYOPATHY, DILATED (HCC): ICD-10-CM

## 2020-05-21 DIAGNOSIS — E78.5 HYPERLIPIDEMIA, UNSPECIFIED HYPERLIPIDEMIA TYPE: ICD-10-CM

## 2020-05-21 PROCEDURE — 99214 OFFICE O/P EST MOD 30 MIN: CPT | Performed by: NURSE PRACTITIONER

## 2020-05-21 NOTE — PROGRESS NOTES
Subjective   Kate Macias is a 79 y.o. female     Chief Complaint   Patient presents with   • Coronary Artery Disease       HPI    Problem list:    1.  CAD  1.1 left heart cath 4/2017 LVEF 20 to 25%, 60% napkin ring stenosis of the left circumflex  2.  Cardiomyopathy  2.1 echo 3/24/17-mitral regurgitation, moderate to severe, EF 20 to 25%  2.2 echo 10/16/18-LVEF 45 to 50%, mildly increased with severe left ventricular diastolic dysfunction, global hypokinesis, left atrium is mildly dilated, moderate posterior annular calcification the mitral valve, mild mitral regurgitation, RVSP is 20 mmHg, pericardial effusion of small size  2.3 echocardiogram 6/28/19-LVEF 40 to 45%, lateral wall hypokinesis, diastolic dysfunction 2, mild MR, trivial to mild TR, small posterior lateral pericardial effusion without tamponade  3.  Hypertension  4.  Hyperlipidemia  5.  Palpitations  6.  Shortness of breath  7.  Arthritis  8.  Glaucoma  9.  Early family history of coronary artery disease  10.  Carotid artery disease  10.1 carotid artery ultrasound-16 to 49% stenosis mid and distal left internal carotid artery, mild bifurcation disease but more than the left with 16-49 stenosis bilaterally, antegrade flow both vertebral arteries    Patient is a 79-year-old female who presents today for follow-up with her  at her side.  She denies any chest pain, pressure, palpitations, fluttering, dizziness, presyncope, syncope, orthopnea, PND or edema.  She says she only gets short of breath if she really exerts herself like hills and stairs or if it superhuman outside otherwise she does fine.  They just moved about 2 months ago so she is still trying to get herself rested from the move.  PCP monitors her cholesterol.    Current Outpatient Medications on File Prior to Visit   Medication Sig Dispense Refill   • aspirin 81 MG EC tablet Take 81 mg by mouth Daily.     • atorvastatin (LIPITOR) 40 MG tablet Take 40 mg by mouth Daily.     • B  Complex Vitamins (VITAMIN B COMPLEX) capsule capsule Take  by mouth Daily.     • Calcium-Magnesium-Vitamin D (CITRACAL CALCIUM+D PO) Take  by mouth.     • Fexofenadine HCl (ALLEGRA ALLERGY PO) Take  by mouth.     • furosemide (LASIX) 20 MG tablet Take 20 mg by mouth Daily.     • latanoprost (XALATAN) 0.005 % ophthalmic solution Take As Directed. 1 drop in each eye, per day     • lisinopril (PRINIVIL,ZESTRIL) 20 MG tablet Take 20 mg by mouth Daily.     • MELATONIN PO Take  by mouth.     • metoprolol succinate XL (TOPROL-XL) 50 MG 24 hr tablet Take 1 tablet by mouth Daily. 90 tablet 3   • Multiple Vitamins-Minerals (MULTIVITAMIN WOMEN 50+ PO) Take  by mouth.     • potassium chloride (K-DUR) 10 MEQ CR tablet Take 1 tablet by mouth Daily.  1     No current facility-administered medications on file prior to visit.        ALLERGIES    Patient has no known allergies.    Past Medical History:   Diagnosis Date   • Arthritis    • CHF (congestive heart failure) (CMS/[facility])    • DDD (degenerative disc disease), cervical    • DDD (degenerative disc disease), lumbar    • DDD (degenerative disc disease), thoracic    • Ejection fraction < 50%    • Enlarged heart    • Glaucoma    • Hyperlipidemia    • Hypertension    • Left ventricular hypertrophy due to hypertensive disease    • Neck pain     reverse curvature of spine in neck        Social History     Socioeconomic History   • Marital status:      Spouse name: Not on file   • Number of children: Not on file   • Years of education: Not on file   • Highest education level: Not on file   Tobacco Use   • Smoking status: Former Smoker     Packs/day: 0.50     Years: 15.00     Pack years: 7.50     Types: Cigarettes   • Smokeless tobacco: Never Used   Substance and Sexual Activity   • Alcohol use: No     Frequency: Never   • Drug use: Never   • Sexual activity: Defer       Family History   Problem Relation Age of Onset   • COPD Mother    • Macular degeneration Mother    • Blindness  "Mother    • No Known Problems Father    • Heart attack Brother        Review of Systems   Constitutional: Positive for fatigue (fatigued due to alot of exertion at this time due to moving and not sleeping well). Negative for fever.   HENT: Negative for congestion, rhinorrhea and sneezing.    Eyes: Positive for visual disturbance (wears glasses daily).   Respiratory: Positive for shortness of breath (SOA on exertion only; with hills, stairs, if humid ). Negative for cough, chest tightness and wheezing.    Cardiovascular: Negative for chest pain, palpitations and leg swelling.   Gastrointestinal: Negative for abdominal pain, blood in stool, constipation, diarrhea, nausea and vomiting.   Endocrine: Negative for cold intolerance and heat intolerance.   Genitourinary: Positive for frequency (urinary frequency from diretic use). Negative for difficulty urinating, hematuria and urgency.   Musculoskeletal: Positive for arthralgias (joints), back pain (back pain from DDD and arthritis) and neck pain (neck pain from reverse curvature of the C spine). Negative for neck stiffness.   Skin: Negative for rash and wound.   Allergic/Immunologic: Negative for environmental allergies and food allergies.   Neurological: Negative for dizziness, syncope, weakness, light-headedness and numbness.   Hematological: Does not bruise/bleed easily.   Psychiatric/Behavioral: Negative for agitation, confusion and sleep disturbance (denies waking up smothering/SOA). The patient is not nervous/anxious.        Objective   /76 (BP Location: Left arm, Patient Position: Sitting)   Pulse 70   Temp 98.4 °F (36.9 °C)   Ht 160 cm (63\")   Wt 90.7 kg (200 lb)   SpO2 98%   BMI 35.43 kg/m²   Vitals:    05/21/20 1306   BP: 137/76   BP Location: Left arm   Patient Position: Sitting   Pulse: 70   Temp: 98.4 °F (36.9 °C)   SpO2: 98%   Weight: 90.7 kg (200 lb)   Height: 160 cm (63\")      Lab Results (most recent)     None        Physical Exam   "   Constitutional: She is oriented to person, place, and time. Vital signs are normal. She appears well-developed and well-nourished. She is active and cooperative.   HENT:   Head: Normocephalic.   Eyes: Lids are normal.   Wears glasses    Neck: Normal carotid pulses, no hepatojugular reflux and no JVD present. Carotid bruit is not present.   Cardiovascular: Normal rate, regular rhythm and normal heart sounds.   Pulses:       Radial pulses are 2+ on the right side, and 2+ on the left side.        Dorsalis pedis pulses are 2+ on the right side, and 2+ on the left side.        Posterior tibial pulses are 2+ on the right side, and 2+ on the left side.   No edema BLE.   Pulmonary/Chest: Effort normal and breath sounds normal.   Abdominal: Normal appearance and bowel sounds are normal.   Neurological: She is alert and oriented to person, place, and time.   Skin: Skin is warm, dry and intact.   Psychiatric: She has a normal mood and affect. Her speech is normal and behavior is normal. Judgment and thought content normal. Cognition and memory are normal.       Procedure   Procedures         Assessment/Plan      Diagnosis Plan   1. Coronary artery disease involving native coronary artery of native heart without angina pectoris     2. Cardiomyopathy, dilated (CMS/HCC)     3. Bilateral carotid artery stenosis     4. Essential hypertension     5. Hyperlipidemia, unspecified hyperlipidemia type     6. Shortness of breath         Return in about 6 months (around 11/21/2020).  CAD-patient's on aspirin, statin and beta.  Cardiomyopathy-patient is on beta and ACE as well as diuretic as needed.  Carotid artery disease-patient's on aspirin and statin.  Hypertension-she says it typically does well on the lisinopril and metoprolol.  Hyperlipidemia-patient's on Lipitor more by PCP.  Shortness of breath-stable.  She will continue her medication regimen.  She will follow-up in 6 months or sooner if any changes.    We will consider  repeating an echo at that time.       Advance Care Planning   ACP discussion was held with the patient during this visit. Patient has an advance directive (not in EMR), copy requested.    Patient's Body mass index is 35.43 kg/m². BMI is above normal parameters. Recommendations include: educational material and referral to primary care.      Electronically signed by:

## 2020-05-21 NOTE — PATIENT INSTRUCTIONS
"BMI for Adults    Body mass index (BMI) is a number that is calculated from a person's weight and height. BMI may help to estimate how much of a person's weight is composed of fat. BMI can help identify those who may be at higher risk for certain medical problems.  How is BMI used with adults?  BMI is used as a screening tool to identify possible weight problems. It is used to check whether a person is obese, overweight, healthy weight, or underweight.  How is BMI calculated?  BMI measures your weight and compares it to your height. This can be done either in English (U.S.) or metric measurements. Note that charts are available to help you find your BMI quickly and easily without having to do these calculations yourself.  To calculate your BMI in English (U.S.) measurements, your health care provider will:  1. Measure your weight in pounds (lb).  2. Multiply the number of pounds by 703.  ? For example, for a person who weighs 180 lb, multiply that number by 703, which equals 126,540.  3. Measure your height in inches (in). Then multiply that number by itself to get a measurement called \"inches squared.\"  ? For example, for a person who is 70 in tall, the \"inches squared\" measurement is 70 in x 70 in, which equals 4900 inches squared.  4. Divide the total from Step 2 (number of lb x 703) by the total from Step 3 (inches squared): 126,540 ÷ 4900 = 25.8. This is your BMI.  To calculate your BMI in metric measurements, your health care provider will:  1. Measure your weight in kilograms (kg).  2. Measure your height in meters (m). Then multiply that number by itself to get a measurement called \"meters squared.\"  ? For example, for a person who is 1.75 m tall, the \"meters squared\" measurement is 1.75 m x 1.75 m, which is equal to 3.1 meters squared.  3. Divide the number of kilograms (your weight) by the meters squared number. In this example: 70 ÷ 3.1 = 22.6. This is your BMI.  How is BMI interpreted?  To interpret your " results, your health care provider will use BMI charts to identify whether you are underweight, normal weight, overweight, or obese. The following guidelines will be used:  · Underweight: BMI less than 18.5.  · Normal weight: BMI between 18.5 and 24.9.  · Overweight: BMI between 25 and 29.9.  · Obese: BMI of 30 and above.  Please note:  · Weight includes both fat and muscle, so someone with a muscular build, such as an athlete, may have a BMI that is higher than 24.9. In cases like these, BMI is not an accurate measure of body fat.  · To determine if excess body fat is the cause of a BMI of 25 or higher, further assessments may need to be done by a health care provider.  · BMI is usually interpreted in the same way for men and women.  Why is BMI a useful tool?  BMI is useful in two ways:  · Identifying a weight problem that may be related to a medical condition, or that may increase the risk for medical problems.  · Promoting lifestyle and diet changes in order to reach a healthy weight.  Summary  · Body mass index (BMI) is a number that is calculated from a person's weight and height.  · BMI may help to estimate how much of a person's weight is composed of fat. BMI can help identify those who may be at higher risk for certain medical problems.  · BMI can be measured using English measurements or metric measurements.  · To interpret your results, your health care provider will use BMI charts to identify whether you are underweight, normal weight, overweight, or obese.  This information is not intended to replace advice given to you by your health care provider. Make sure you discuss any questions you have with your health care provider.  Document Released: 08/29/2005 Document Revised: 11/30/2018 Document Reviewed: 10/31/2018  Elsevier Patient Education © 2020 [employer]    Advance Care Planning and Advance Directives     You make decisions on a daily basis - decisions about where you want to live, your career,  your home, your life. Perhaps one of the most important decisions you face is your choice for future medical care. Take time to talk with your family and your healthcare team and start planning today.  Advance Care Planning is a process that can help you:  · Understand possible future healthcare decisions in light of your own experiences  · Reflect on those decision in light of your goals and values  · Discuss your decisions with those closest to you and the healthcare professionals that care for you  · Make a plan by creating a document that reflects your wishes    Surrogate Decision Maker  In the event of a medical emergency, which has left you unable to communicate or to make your own decisions, you would need someone to make decisions for you.  It is important to discuss your preferences for medical treatment with this person while you are in good health.     Qualities of a surrogate decision maker:  • Willing to take on this role and responsibility  • Knows what you want for future medical care  • Willing to follow your wishes even if they don't agree with them  • Able to make difficult medical decisions under stressful circumstances    Advance Directives  These are legal documents you can create that will guide your healthcare team and decision maker(s) when needed. These documents can be stored in the electronic medical record.    · Living Will - a legal document to guide your care if you have a terminal condition or a serious illness and are unable to communicate. States vary by statute in document names/types, but most forms may include one or more of the following:        -  Directions regarding life-prolonging treatments        -  Directions regarding artificially provided nutrition/hydration        -  Choosing a healthcare decision maker        -  Direction regarding organ/tissue donation    · Durable Power of  for Healthcare - this document names an -in-fact to make medical decisions for  you, but it may also allow this person to make personal and financial decisions for you. Please seek the advice of an  if you need this type of document.    **Advance Directives are not required and no one may discriminate against you if you do not sign one.    Medical Orders  Many states allow specific forms/orders signed by your physician to record your wishes for medical treatment in your current state of health. This form, signed in personal communication with your physician, addresses resuscitation and other medical interventions that you may or may not want.      For more information or to schedule a time with a [facility] Advance Care Planning Facilitator contact: [email or web address] or call [phone] and someone will contact you directly.

## 2020-11-23 ENCOUNTER — OFFICE VISIT (OUTPATIENT)
Dept: CARDIOLOGY | Facility: CLINIC | Age: 80
End: 2020-11-23

## 2020-11-23 VITALS
OXYGEN SATURATION: 99 % | DIASTOLIC BLOOD PRESSURE: 86 MMHG | SYSTOLIC BLOOD PRESSURE: 126 MMHG | WEIGHT: 200 LBS | HEIGHT: 63 IN | HEART RATE: 83 BPM | BODY MASS INDEX: 35.44 KG/M2

## 2020-11-23 DIAGNOSIS — I42.0 CARDIOMYOPATHY, DILATED (HCC): ICD-10-CM

## 2020-11-23 DIAGNOSIS — I65.23 BILATERAL CAROTID ARTERY STENOSIS: ICD-10-CM

## 2020-11-23 DIAGNOSIS — E78.5 HYPERLIPIDEMIA, UNSPECIFIED HYPERLIPIDEMIA TYPE: ICD-10-CM

## 2020-11-23 DIAGNOSIS — R06.02 SHORTNESS OF BREATH: ICD-10-CM

## 2020-11-23 DIAGNOSIS — I25.10 CORONARY ARTERY DISEASE INVOLVING NATIVE CORONARY ARTERY OF NATIVE HEART WITHOUT ANGINA PECTORIS: Primary | ICD-10-CM

## 2020-11-23 DIAGNOSIS — E66.01 MORBIDLY OBESE (HCC): ICD-10-CM

## 2020-11-23 DIAGNOSIS — I10 ESSENTIAL HYPERTENSION: ICD-10-CM

## 2020-11-23 PROCEDURE — 99214 OFFICE O/P EST MOD 30 MIN: CPT | Performed by: NURSE PRACTITIONER

## 2020-11-23 PROCEDURE — 93000 ELECTROCARDIOGRAM COMPLETE: CPT | Performed by: NURSE PRACTITIONER

## 2020-11-23 NOTE — PROGRESS NOTES
Subjective   Kate Macias is a 80 y.o. female     Chief Complaint   Patient presents with   • Follow-up     six month follow up       HPI    Problem list:    1.  CAD  1.1 left heart cath 4/2017 - LVEF 20 to 25%, 60% napkin ring stenosis of the left circumflex  2.  Cardiomyopathy  2.1 echo 3/24/17-mitral regurgitation, moderate to severe, EF 20 to 25%  2.2 echo 10/16/18-LVEF 45 to 50%, mildly increased with severe left ventricular diastolic dysfunction, global hypokinesis, left atrium is mildly dilated, moderate posterior annular calcification the mitral valve, mild mitral regurgitation, RVSP is 20 mmHg, pericardial effusion of small size  2.3 echocardiogram 6/28/19-LVEF 40 to 45%, lateral wall hypokinesis, diastolic dysfunction 2, mild MR, trivial to mild TR, small posterior lateral pericardial effusion without tamponade  3.  Hypertension  4.  Hyperlipidemia  5.  Palpitations  6.  Shortness of breath  7.  Arthritis  8.  Glaucoma  9.  Early family history of coronary artery disease  10.  Carotid artery disease  10.1 carotid artery ultrasound-16 to 49% stenosis mid and distal left internal carotid artery, mild bifurcation disease but more than the left with 16-49 stenosis bilaterally, antegrade flow both vertebral arteries    Patient is an 80-year-old female who presents today for a follow-up with her  at her side.   She denies any chest pain, pressure, palpitations, fluttering, dizziness, presyncope, syncope, orthopnea, PND or edema.  She does get short of breath if she does more than normal, however, says she has been able to do more this past summer than in a long time.      Patient would like to go back on Entresto she has in a different insurance company now.  I advised her that we would need to repeat echo and see what her EF currently is as they will only cover if it is 40% or less.    Current Outpatient Medications on File Prior to Visit   Medication Sig Dispense Refill   • aspirin 81 MG EC tablet  Take 81 mg by mouth Daily.     • atorvastatin (LIPITOR) 40 MG tablet Take 40 mg by mouth Daily.     • B Complex Vitamins (VITAMIN B COMPLEX) capsule capsule Take  by mouth Daily.     • Calcium-Magnesium-Vitamin D (CITRACAL CALCIUM+D PO) Take  by mouth.     • Fexofenadine HCl (ALLEGRA ALLERGY PO) Take  by mouth.     • furosemide (LASIX) 20 MG tablet Take 20 mg by mouth Daily.     • latanoprost (XALATAN) 0.005 % ophthalmic solution Take As Directed. 1 drop in each eye, per day     • lisinopril (PRINIVIL,ZESTRIL) 20 MG tablet Take 20 mg by mouth Daily.     • MELATONIN PO Take  by mouth.     • metoprolol succinate XL (TOPROL-XL) 50 MG 24 hr tablet Take 1 tablet by mouth Daily. 90 tablet 3   • Multiple Vitamins-Minerals (MULTIVITAMIN WOMEN 50+ PO) Take  by mouth.     • potassium chloride (K-DUR) 10 MEQ CR tablet Take 1 tablet by mouth Daily.  1     No current facility-administered medications on file prior to visit.        ALLERGIES    Patient has no known allergies.    Past Medical History:   Diagnosis Date   • Arthritis    • CHF (congestive heart failure) (CMS/[facility])    • DDD (degenerative disc disease), cervical    • DDD (degenerative disc disease), lumbar    • DDD (degenerative disc disease), thoracic    • Ejection fraction < 50%    • Enlarged heart    • Glaucoma    • Hyperlipidemia    • Hypertension    • Left ventricular hypertrophy due to hypertensive disease    • Neck pain     reverse curvature of spine in neck        Social History     Socioeconomic History   • Marital status:      Spouse name: Not on file   • Number of children: Not on file   • Years of education: Not on file   • Highest education level: Not on file   Tobacco Use   • Smoking status: Former Smoker     Packs/day: 0.50     Years: 15.00     Pack years: 7.50     Types: Cigarettes   • Smokeless tobacco: Never Used   Substance and Sexual Activity   • Alcohol use: No     Frequency: Never   • Drug use: Never   • Sexual activity: Defer       Family  "History   Problem Relation Age of Onset   • COPD Mother    • Macular degeneration Mother    • Blindness Mother    • No Known Problems Father    • Heart attack Brother        Review of Systems   Constitutional: Negative for chills, fatigue and fever.   HENT: Negative for congestion, rhinorrhea and sore throat.    Eyes: Positive for visual disturbance (glasses).   Respiratory: Positive for cough (allergies; thinks due to mold on the place they moved to,  has it as well ) and shortness of breath (if she does more than normal, but otherwise does good ). Negative for chest tightness and wheezing.    Cardiovascular: Negative for chest pain, palpitations and leg swelling.   Gastrointestinal: Negative for abdominal pain, blood in stool, nausea and vomiting.   Endocrine: Negative for cold intolerance and heat intolerance.   Genitourinary: Positive for frequency (with lasix). Negative for dysuria, hematuria and urgency.   Musculoskeletal: Positive for arthralgias (joints), back pain (lower back; hurt back after doing a lot of work; L1 and L2 bulding disc, herniated disc and DDD; bone spurs; no treatment) and neck pain.   Skin: Negative for rash and wound.   Allergic/Immunologic: Positive for environmental allergies (seasonal). Negative for food allergies.   Neurological: Negative for dizziness, weakness and light-headedness (resolved ).   Hematological: Bruises/bleeds easily.   Psychiatric/Behavioral: Negative for sleep disturbance (denies waking with smothering at night).       Objective   /86 (BP Location: Right arm, Patient Position: Sitting)   Pulse 83   Ht 160 cm (63\")   Wt 90.7 kg (200 lb)   SpO2 99%   BMI 35.43 kg/m²   Vitals:    11/23/20 1045 11/23/20 1122   BP: 170/83 126/86   BP Location: Left arm Right arm   Patient Position: Sitting Sitting   Pulse: 83    SpO2: 99%    Weight: 90.7 kg (200 lb)    Height: 160 cm (63\")       Lab Results (most recent)     None        Physical Exam  Vitals signs " reviewed.   Constitutional:       General: She is awake.      Appearance: Normal appearance. She is well-developed and well-groomed. She is obese.   HENT:      Head: Normocephalic.   Eyes:      General: Lids are normal.      Comments: Wears glasses    Neck:      Vascular: No carotid bruit, hepatojugular reflux or JVD.   Cardiovascular:      Rate and Rhythm: Normal rate and regular rhythm.      Pulses:           Radial pulses are 2+ on the right side and 2+ on the left side.        Dorsalis pedis pulses are 2+ on the right side and 2+ on the left side.        Posterior tibial pulses are 2+ on the right side and 2+ on the left side.   Musculoskeletal:      Right lower le+ Pitting Edema present.      Left lower le+ Pitting Edema present.   Skin:     General: Skin is warm and dry.   Neurological:      Mental Status: She is alert and oriented to person, place, and time.   Psychiatric:         Attention and Perception: Attention and perception normal.         Mood and Affect: Mood and affect normal.         Speech: Speech normal.         Behavior: Behavior normal. Behavior is cooperative.         Thought Content: Thought content normal.         Cognition and Memory: Cognition and memory normal.         Judgment: Judgment normal.         Procedure     ECG 12 Lead    Date/Time: 2020 11:13 AM  Performed by: Susan Rankin APRN  Authorized by: Susan Rankin APRN   Comparison: compared with previous ECG from 6/3/2019  Similar to previous ECG  Rhythm: sinus rhythm  Rate: normal  BPM: 66  Conduction: non-specific intraventricular conduction delay  QRS axis: normal    Clinical impression: abnormal EKG                 Assessment/Plan      Diagnosis Plan   1. Coronary artery disease involving native coronary artery of native heart without angina pectoris  ECG 12 Lead    Adult Transthoracic Echo Complete W/ Cont if Necessary Per Protocol   2. Essential hypertension  ECG 12 Lead    Adult Transthoracic Echo Complete  W/ Cont if Necessary Per Protocol   3. Hyperlipidemia, unspecified hyperlipidemia type     4. Cardiomyopathy, dilated (CMS/HCC)  Adult Transthoracic Echo Complete W/ Cont if Necessary Per Protocol   5. Bilateral carotid artery stenosis     6. Shortness of breath  Adult Transthoracic Echo Complete W/ Cont if Necessary Per Protocol   7. Morbidly obese (CMS/HCC)         Return in about 12 weeks (around 2/15/2021).    CAD-patient's on aspirin, statin and beta.  Hypertension-patient's doing very well on lisinopril and metoprolol.  Hyperlipidemia-patient is on Lipitor monitor by PCP.  Cardiomyopathy-patient is on diuretic, beta and ACE.  Carotid artery disease-patient's on aspirin and statin.  She will continue her medication regimen.  Cardiomyopathy/CAD/hypertension/shortness of breath-she will have an echocardiogram.  She will follow-up in 12 weeks or sooner if any changes.  If EF is 40% or less we will try to get her reapproved for Entresto.       Kate Macias  reports that she has quit smoking. Her smoking use included cigarettes. She has a 7.50 pack-year smoking history. She has never used smokeless tobacco.       Patient's Body mass index is 35.43 kg/m². BMI is above normal parameters. Recommendations include: educational material and referral to primary care.    Advance Care Planning   ACP discussion was held with the patient during this visit. Patient does not have an advance directive, information provided.      Electronically signed by:

## 2020-11-23 NOTE — PATIENT INSTRUCTIONS
"BMI for Adults  What is BMI?  Body mass index (BMI) is a number that is calculated from a person's weight and height. BMI can help estimate how much of a person's weight is composed of fat. BMI does not measure body fat directly. Rather, it is an alternative to procedures that directly measure body fat, which can be difficult and expensive.  BMI can help identify people who may be at higher risk for certain medical problems.  What are BMI measurements used for?  BMI is used as a screening tool to identify possible weight problems. It helps determine whether a person is obese, overweight, a healthy weight, or underweight.  BMI is useful for:  · Identifying a weight problem that may be related to a medical condition or may increase the risk for medical problems.  · Promoting changes, such as changes in diet and exercise, to help reach a healthy weight. BMI screening can be repeated to see if these changes are working.  How is BMI calculated?  BMI involves measuring your weight in relation to your height. Both height and weight are measured, and the BMI is calculated from those numbers. This can be done either in English (U.S.) or metric measurements. Note that charts and online BMI calculators are available to help you find your BMI quickly and easily without having to do these calculations yourself.  To calculate your BMI in English ([place].S.) measurements:    1. Measure your weight in pounds (lb).  2. Multiply the number of pounds by 703.  ? For example, for a person who weighs 180 lb, multiply that number by 703, which equals 126,540.  3. Measure your height in inches. Then multiply that number by itself to get a measurement called \"inches squared.\"  ? For example, for a person who is 70 inches tall, the \"inches squared\" measurement is 70 inches x 70 inches, which equals 4,900 inches squared.  4. Divide the total from step 2 (number of lb x 703) by the total from step 3 (inches squared): 126,540 ÷ 4,900 = 25.8. This is " "your BMI.  To calculate your BMI in metric measurements:  1. Measure your weight in kilograms (kg).  2. Measure your height in meters (m). Then multiply that number by itself to get a measurement called \"meters squared.\"  ? For example, for a person who is 1.75 m tall, the \"meters squared\" measurement is 1.75 m x 1.75 m, which is equal to 3.1 meters squared.  3. Divide the number of kilograms (your weight) by the meters squared number. In this example: 70 ÷ 3.1 = 22.6. This is your BMI.  What do the results mean?  BMI charts are used to identify whether you are underweight, normal weight, overweight, or obese. The following guidelines will be used:  · Underweight: BMI less than 18.5.  · Normal weight: BMI between 18.5 and 24.9.  · Overweight: BMI between 25 and 29.9.  · Obese: BMI of 30 or above.  Keep these notes in mind:  · Weight includes both fat and muscle, so someone with a muscular build, such as an athlete, may have a BMI that is higher than 24.9. In cases like these, BMI is not an accurate measure of body fat.  · To determine if excess body fat is the cause of a BMI of 25 or higher, further assessments may need to be done by a health care provider.  · BMI is usually interpreted in the same way for men and women.  Where to find more information  For more information about BMI, including tools to quickly calculate your BMI, go to these websites:  · Centers for Disease Control and Prevention: www.cdc.gov  · American Heart Association: www.heart.org  · National Heart, Lung, and Blood [place]: www.nhlbi.nih.gov  Summary  · Body mass index (BMI) is a number that is calculated from a person's weight and height.  · BMI may help estimate how much of a person's weight is composed of fat. BMI can help identify those who may be at higher risk for certain medical problems.  · BMI can be measured using English measurements or metric measurements.  · BMI charts are used to identify whether you are underweight, normal " weight, overweight, or obese.  This information is not intended to replace advice given to you by your health care provider. Make sure you discuss any questions you have with your health care provider.  Document Released: 08/29/2005 Document Revised: 09/09/2020 Document Reviewed: 07/17/2020  Elsevier Patient Education © 2020 [employer]    Advance Care Planning and Advance Directives     You make decisions on a daily basis - decisions about where you want to live, your career, your home, your life. Perhaps one of the most important decisions you face is your choice for future medical care. Take time to talk with your family and your healthcare team and start planning today.  Advance Care Planning is a process that can help you:  · Understand possible future healthcare decisions in light of your own experiences  · Reflect on those decision in light of your goals and values  · Discuss your decisions with those closest to you and the healthcare professionals that care for you  · Make a plan by creating a document that reflects your wishes    Surrogate Decision Maker  In the event of a medical emergency, which has left you unable to communicate or to make your own decisions, you would need someone to make decisions for you.  It is important to discuss your preferences for medical treatment with this person while you are in good health.     Qualities of a surrogate decision maker:  • Willing to take on this role and responsibility  • Knows what you want for future medical care  • Willing to follow your wishes even if they don't agree with them  • Able to make difficult medical decisions under stressful circumstances    Advance Directives  These are legal documents you can create that will guide your healthcare team and decision maker(s) when needed. These documents can be stored in the electronic medical record.    · Living Will - a legal document to guide your care if you have a terminal condition or a serious illness  and are unable to communicate. States vary by statute in document names/types, but most forms may include one or more of the following:        -  Directions regarding life-prolonging treatments        -  Directions regarding artificially provided nutrition/hydration        -  Choosing a healthcare decision maker        -  Direction regarding organ/tissue donation    · Durable Power of  for Healthcare - this document names an -in-fact to make medical decisions for you, but it may also allow this person to make personal and financial decisions for you. Please seek the advice of an  if you need this type of document.    **Advance Directives are not required and no one may discriminate against you if you do not sign one.    Medical Orders  Many states allow specific forms/orders signed by your physician to record your wishes for medical treatment in your current state of health. This form, signed in personal communication with your physician, addresses resuscitation and other medical interventions that you may or may not want.      For more information or to schedule a time with a [facility] Advance Care Planning Facilitator contact: [email or web address] or call [phone] and someone will contact you directly.

## 2021-01-06 ENCOUNTER — HOSPITAL ENCOUNTER (OUTPATIENT)
Dept: CARDIOLOGY | Facility: HOSPITAL | Age: 81
Discharge: HOME | End: 2021-01-06
Admitting: NURSE PRACTITIONER

## 2021-01-06 VITALS — HEIGHT: 63 IN | BODY MASS INDEX: 35.43 KG/M2 | WEIGHT: 199.96 LBS

## 2021-01-06 DIAGNOSIS — I25.10 CORONARY ARTERY DISEASE INVOLVING NATIVE CORONARY ARTERY OF NATIVE HEART WITHOUT ANGINA PECTORIS: ICD-10-CM

## 2021-01-06 DIAGNOSIS — R06.02 SHORTNESS OF BREATH: ICD-10-CM

## 2021-01-06 DIAGNOSIS — I10 ESSENTIAL HYPERTENSION: ICD-10-CM

## 2021-01-06 DIAGNOSIS — I42.0 CARDIOMYOPATHY, DILATED (HCC): ICD-10-CM

## 2021-01-06 PROCEDURE — 93356 MYOCRD STRAIN IMG SPCKL TRCK: CPT

## 2021-01-06 PROCEDURE — 93356 MYOCRD STRAIN IMG SPCKL TRCK: CPT | Performed by: INTERNAL MEDICINE

## 2021-01-06 PROCEDURE — 93306 TTE W/DOPPLER COMPLETE: CPT | Performed by: INTERNAL MEDICINE

## 2021-01-06 PROCEDURE — 93306 TTE W/DOPPLER COMPLETE: CPT

## 2021-01-13 ENCOUNTER — TELEPHONE (OUTPATIENT)
Dept: CARDIOLOGY | Facility: CLINIC | Age: 81
End: 2021-01-13

## 2021-01-13 LAB
AORTIC DIMENSIONLESS INDEX: 0.7 (DI)
BH CV ECHO MEAS - ACS: 1.7 CM
BH CV ECHO MEAS - AO MAX PG (FULL): 3.1 MMHG
BH CV ECHO MEAS - AO MAX PG: 5.7 MMHG
BH CV ECHO MEAS - AO MEAN PG (FULL): 2 MMHG
BH CV ECHO MEAS - AO MEAN PG: 3 MMHG
BH CV ECHO MEAS - AO ROOT AREA (BSA CORRECTED): 1.4
BH CV ECHO MEAS - AO ROOT AREA: 6.2 CM^2
BH CV ECHO MEAS - AO ROOT DIAM: 2.8 CM
BH CV ECHO MEAS - AO V2 MAX: 119 CM/SEC
BH CV ECHO MEAS - AO V2 MEAN: 84.7 CM/SEC
BH CV ECHO MEAS - AO V2 VTI: 29 CM
BH CV ECHO MEAS - BSA(HAYCOCK): 2 M^2
BH CV ECHO MEAS - BSA: 1.9 M^2
BH CV ECHO MEAS - BZI_BMI: 35.4 KILOGRAMS/M^2
BH CV ECHO MEAS - BZI_METRIC_HEIGHT: 160 CM
BH CV ECHO MEAS - BZI_METRIC_WEIGHT: 90.7 KG
BH CV ECHO MEAS - EDV(CUBED): 179.4 ML
BH CV ECHO MEAS - EDV(MOD-SP4): 142 ML
BH CV ECHO MEAS - EDV(TEICH): 156.2 ML
BH CV ECHO MEAS - EF(CUBED): 50.5 %
BH CV ECHO MEAS - EF(MOD-BP): 46 %
BH CV ECHO MEAS - EF(MOD-SP4): 46.2 %
BH CV ECHO MEAS - EF(TEICH): 42 %
BH CV ECHO MEAS - EF_3D-VOL: 46 %
BH CV ECHO MEAS - ESV(CUBED): 88.7 ML
BH CV ECHO MEAS - ESV(MOD-SP4): 76.4 ML
BH CV ECHO MEAS - ESV(TEICH): 90.5 ML
BH CV ECHO MEAS - FS: 20.9 %
BH CV ECHO MEAS - IVS/LVPW: 1.1
BH CV ECHO MEAS - IVSD: 1.3 CM
BH CV ECHO MEAS - LA 3D VOL INDEX: 39
BH CV ECHO MEAS - LA DIMENSION(2D): 4.1 CM
BH CV ECHO MEAS - LA DIMENSION: 3.8 CM
BH CV ECHO MEAS - LA/AO: 1.4
BH CV ECHO MEAS - LAT PEAK E' VEL: 4.1 CM/SEC
BH CV ECHO MEAS - LV DIASTOLIC VOL/BSA (35-75): 73.4 ML/M^2
BH CV ECHO MEAS - LV IVRT: 0.16 SEC
BH CV ECHO MEAS - LV MASS(C)D: 282.1 GRAMS
BH CV ECHO MEAS - LV MASS(C)DI: 145.9 GRAMS/M^2
BH CV ECHO MEAS - LV MAX PG: 2.5 MMHG
BH CV ECHO MEAS - LV MEAN PG: 1 MMHG
BH CV ECHO MEAS - LV SYSTOLIC VOL/BSA (12-30): 39.5 ML/M^2
BH CV ECHO MEAS - LV V1 MAX: 79.6 CM/SEC
BH CV ECHO MEAS - LV V1 MEAN: 47.1 CM/SEC
BH CV ECHO MEAS - LV V1 VTI: 15.9 CM
BH CV ECHO MEAS - LVIDD: 5.6 CM
BH CV ECHO MEAS - LVIDS: 4.5 CM
BH CV ECHO MEAS - LVLD AP4: 8.5 CM
BH CV ECHO MEAS - LVLS AP4: 7.5 CM
BH CV ECHO MEAS - LVPWD: 1.1 CM
BH CV ECHO MEAS - MED PEAK E' VEL: 3.5 CM/SEC
BH CV ECHO MEAS - MR MAX PG: 83.5 MMHG
BH CV ECHO MEAS - MR MAX VEL: 457 CM/SEC
BH CV ECHO MEAS - MR MEAN PG: 34 MMHG
BH CV ECHO MEAS - MR MEAN VEL: 259 CM/SEC
BH CV ECHO MEAS - MR VTI: 102 CM
BH CV ECHO MEAS - MV A MAX VEL: 116 CM/SEC
BH CV ECHO MEAS - MV DEC SLOPE: 370 CM/SEC^2
BH CV ECHO MEAS - MV DEC TIME: 0.19 SEC
BH CV ECHO MEAS - MV E MAX VEL: 66.5 CM/SEC
BH CV ECHO MEAS - MV E/A: 0.57
BH CV ECHO MEAS - MV MAX PG: 8.9 MMHG
BH CV ECHO MEAS - MV MEAN PG: 3 MMHG
BH CV ECHO MEAS - MV P1/2T MAX VEL: 79.2 CM/SEC
BH CV ECHO MEAS - MV P1/2T: 62.7 MSEC
BH CV ECHO MEAS - MV V2 MAX: 149 CM/SEC
BH CV ECHO MEAS - MV V2 MEAN: 73.9 CM/SEC
BH CV ECHO MEAS - MV V2 VTI: 41.3 CM
BH CV ECHO MEAS - MVA P1/2T LCG: 2.8 CM^2
BH CV ECHO MEAS - MVA(P1/2T): 3.5 CM^2
BH CV ECHO MEAS - PA MAX PG (FULL): 1.4 MMHG
BH CV ECHO MEAS - PA MAX PG: 3.8 MMHG
BH CV ECHO MEAS - PA MEAN PG (FULL): 1 MMHG
BH CV ECHO MEAS - PA MEAN PG: 2 MMHG
BH CV ECHO MEAS - PA V2 MAX: 97.6 CM/SEC
BH CV ECHO MEAS - PA V2 MEAN: 63.2 CM/SEC
BH CV ECHO MEAS - PA V2 VTI: 24.3 CM
BH CV ECHO MEAS - RV MAX PG: 2.4 MMHG
BH CV ECHO MEAS - RV MEAN PG: 1 MMHG
BH CV ECHO MEAS - RV V1 MAX: 77.7 CM/SEC
BH CV ECHO MEAS - RV V1 MEAN: 44.8 CM/SEC
BH CV ECHO MEAS - RV V1 VTI: 16.8 CM
BH CV ECHO MEAS - RVDD: 2.3 CM
BH CV ECHO MEAS - SI(AO): 92.3 ML/M^2
BH CV ECHO MEAS - SI(CUBED): 46.9 ML/M^2
BH CV ECHO MEAS - SI(MOD-SP4): 33.9 ML/M^2
BH CV ECHO MEAS - SI(TEICH): 34 ML/M^2
BH CV ECHO MEAS - SV(AO): 178.6 ML
BH CV ECHO MEAS - SV(CUBED): 90.7 ML
BH CV ECHO MEAS - SV(MOD-SP4): 65.6 ML
BH CV ECHO MEAS - SV(TEICH): 65.7 ML
BH CV ECHO MEASUREMENTS AVERAGE E/E' RATIO: 17.5
MAXIMAL PREDICTED HEART RATE: 140 BPM
STRESS TARGET HR: 119 BPM

## 2021-01-13 NOTE — TELEPHONE ENCOUNTER
Called and advised pt of her results .          The left ventricle is mildly dilated, global LV systolic function is mildly to moderately depressed with a visually estimated ejection fraction of 40 to 45%.       Left Voice mail for Pt to call back regarding   Echo results     Raysa DUMONT      ----- Message from BRISA Valdivia sent at 1/13/2021  4:36 PM EST -----  Advise patient stable echo.

## 2021-01-25 RX ORDER — METOPROLOL SUCCINATE 50 MG/1
50 TABLET, EXTENDED RELEASE ORAL DAILY
Qty: 90 TABLET | Refills: 3 | Status: SHIPPED | OUTPATIENT
Start: 2021-01-25 | End: 2022-01-05

## 2021-03-18 ENCOUNTER — OFFICE VISIT (OUTPATIENT)
Dept: CARDIOLOGY | Facility: CLINIC | Age: 81
End: 2021-03-18

## 2021-03-18 VITALS
OXYGEN SATURATION: 94 % | HEIGHT: 63 IN | WEIGHT: 203 LBS | DIASTOLIC BLOOD PRESSURE: 78 MMHG | HEART RATE: 60 BPM | BODY MASS INDEX: 35.97 KG/M2 | SYSTOLIC BLOOD PRESSURE: 127 MMHG | TEMPERATURE: 97.8 F

## 2021-03-18 DIAGNOSIS — R60.9 PERIPHERAL EDEMA: ICD-10-CM

## 2021-03-18 DIAGNOSIS — I10 ESSENTIAL HYPERTENSION: ICD-10-CM

## 2021-03-18 DIAGNOSIS — E66.01 MORBIDLY OBESE (HCC): ICD-10-CM

## 2021-03-18 DIAGNOSIS — I51.89 GRADE I DIASTOLIC DYSFUNCTION: ICD-10-CM

## 2021-03-18 DIAGNOSIS — I42.0 DILATED CARDIOMYOPATHY (HCC): Primary | ICD-10-CM

## 2021-03-18 DIAGNOSIS — I25.10 CORONARY ARTERY DISEASE INVOLVING NATIVE CORONARY ARTERY OF NATIVE HEART WITHOUT ANGINA PECTORIS: ICD-10-CM

## 2021-03-18 DIAGNOSIS — R06.02 SHORTNESS OF BREATH: ICD-10-CM

## 2021-03-18 PROBLEM — R60.0 PERIPHERAL EDEMA: Status: ACTIVE | Noted: 2021-03-18

## 2021-03-18 PROCEDURE — 99214 OFFICE O/P EST MOD 30 MIN: CPT | Performed by: NURSE PRACTITIONER

## 2021-03-18 NOTE — PATIENT INSTRUCTIONS
"BMI for Adults  What is BMI?  Body mass index (BMI) is a number that is calculated from a person's weight and height. BMI can help estimate how much of a person's weight is composed of fat. BMI does not measure body fat directly. Rather, it is an alternative to procedures that directly measure body fat, which can be difficult and expensive.  BMI can help identify people who may be at higher risk for certain medical problems.  What are BMI measurements used for?  BMI is used as a screening tool to identify possible weight problems. It helps determine whether a person is obese, overweight, a healthy weight, or underweight.  BMI is useful for:  · Identifying a weight problem that may be related to a medical condition or may increase the risk for medical problems.  · Promoting changes, such as changes in diet and exercise, to help reach a healthy weight. BMI screening can be repeated to see if these changes are working.  How is BMI calculated?  BMI involves measuring your weight in relation to your height. Both height and weight are measured, and the BMI is calculated from those numbers. This can be done either in English (U[place] or metric measurements. Note that charts and online BMI calculators are available to help you find your BMI quickly and easily without having to do these calculations yourself.  To calculate your BMI in English ([place] measurements:    1. Measure your weight in pounds (lb).  2. Multiply the number of pounds by 703.  ? For example, for a person who weighs 180 lb, multiply that number by 703, which equals 126,540.  3. Measure your height in inches. Then multiply that number by itself to get a measurement called \"inches squared.\"  ? For example, for a person who is 70 inches tall, the \"inches squared\" measurement is 70 inches x 70 inches, which equals 4,900 inches squared.  4. Divide the total from step 2 (number of lb x 703) by the total from step 3 (inches squared): 126,540 ÷ 4,900 = 25.8. This is " "your BMI.  To calculate your BMI in metric measurements:  1. Measure your weight in kilograms (kg).  2. Measure your height in meters (m). Then multiply that number by itself to get a measurement called \"meters squared.\"  ? For example, for a person who is 1.75 m tall, the \"meters squared\" measurement is 1.75 m x 1.75 m, which is equal to 3.1 meters squared.  3. Divide the number of kilograms (your weight) by the meters squared number. In this example: 70 ÷ 3.1 = 22.6. This is your BMI.  What do the results mean?  BMI charts are used to identify whether you are underweight, normal weight, overweight, or obese. The following guidelines will be used:  · Underweight: BMI less than 18.5.  · Normal weight: BMI between 18.5 and 24.9.  · Overweight: BMI between 25 and 29.9.  · Obese: BMI of 30 or above.  Keep these notes in mind:  · Weight includes both fat and muscle, so someone with a muscular build, such as an athlete, may have a BMI that is higher than 24.9. In cases like these, BMI is not an accurate measure of body fat.  · To determine if excess body fat is the cause of a BMI of 25 or higher, further assessments may need to be done by a health care provider.  · BMI is usually interpreted in the same way for men and women.  Where to find more information  For more information about BMI, including tools to quickly calculate your BMI, go to these websites:  · Centers for Disease Control and Prevention: www.cdc.gov  · American Heart Association: www.heart.org  · National Heart, Lung, and Blood [place]: www.nhlbi.nih.gov  Summary  · Body mass index (BMI) is a number that is calculated from a person's weight and height.  · BMI may help estimate how much of a person's weight is composed of fat. BMI can help identify those who may be at higher risk for certain medical problems.  · BMI can be measured using English measurements or metric measurements.  · BMI charts are used to identify whether you are underweight, normal " weight, overweight, or obese.  This information is not intended to replace advice given to you by your health care provider. Make sure you discuss any questions you have with your health care provider.  Document Revised: 09/09/2020 Document Reviewed: 07/17/2020  [employer] Patient Education © 2021 [employer]    Advance Care Planning and Advance Directives     You make decisions on a daily basis - decisions about where you want to live, your career, your home, your life. Perhaps one of the most important decisions you face is your choice for future medical care. Take time to talk with your family and your healthcare team and start planning today.  Advance Care Planning is a process that can help you:  · Understand possible future healthcare decisions in light of your own experiences  · Reflect on those decision in light of your goals and values  · Discuss your decisions with those closest to you and the healthcare professionals that care for you  · Make a plan by creating a document that reflects your wishes    Surrogate Decision Maker  In the event of a medical emergency, which has left you unable to communicate or to make your own decisions, you would need someone to make decisions for you.  It is important to discuss your preferences for medical treatment with this person while you are in good health.     Qualities of a surrogate decision maker:  • Willing to take on this role and responsibility  • Knows what you want for future medical care  • Willing to follow your wishes even if they don't agree with them  • Able to make difficult medical decisions under stressful circumstances    Advance Directives  These are legal documents you can create that will guide your healthcare team and decision maker(s) when needed. These documents can be stored in the electronic medical record.    · Living Will - a legal document to guide your care if you have a terminal condition or a serious illness and are unable to  communicate. States vary by statute in document names/types, but most forms may include one or more of the following:        -  Directions regarding life-prolonging treatments        -  Directions regarding artificially provided nutrition/hydration        -  Choosing a healthcare decision maker        -  Direction regarding organ/tissue donation    · Durable Power of  for Healthcare - this document names an -in-fact to make medical decisions for you, but it may also allow this person to make personal and financial decisions for you. Please seek the advice of an  if you need this type of document.    **Advance Directives are not required and no one may discriminate against you if you do not sign one.    Medical Orders  Many states allow specific forms/orders signed by your physician to record your wishes for medical treatment in your current state of health. This form, signed in personal communication with your physician, addresses resuscitation and other medical interventions that you may or may not want.      For more information or to schedule a time with a [facility] Advance Care Planning Facilitator contact: [facility]/ACP or call [phone] and someone will contact you directly.  Edema    Edema is an abnormal buildup of fluids in the body tissues and under the skin. Swelling of the legs, feet, and ankles is a common symptom that becomes more likely as you get older. Swelling is also common in looser tissues, like around the eyes. When the affected area is squeezed, the fluid may move out of that spot and leave a dent for a few moments. This dent is called pitting edema.  There are many possible causes of edema. Eating too much salt (sodium) and being on your feet or sitting for a long time can cause edema in your legs, feet, and ankles. Hot weather may make edema worse. Common causes of edema include:  · Heart failure.  · Liver or kidney disease.  · Weak leg blood  vessels.  · Cancer.  · An injury.  · Pregnancy.  · Medicines.  · Being obese.  · Low protein levels in the blood.  Edema is usually painless. Your skin may look swollen or shiny.  Follow these instructions at home:  · Keep the affected body part raised (elevated) above the level of your heart when you are sitting or lying down.  · Do not sit still or stand for long periods of time.  · Do not wear tight clothing. Do not wear garters on your upper legs.  · Exercise your legs to get your circulation going. This helps to move the fluid back into your blood vessels, and it may help the swelling go down.  · Wear elastic bandages or support stockings to reduce swelling as told by your health care provider.  · Eat a low-salt (low-sodium) diet to reduce fluid as told by your health care provider.  · Depending on the cause of your swelling, you may need to limit how much fluid you drink (fluid restriction).  · Take over-the-counter and prescription medicines only as told by your health care provider.  Contact a health care provider if:  · Your edema does not get better with treatment.  · You have heart, liver, or kidney disease and have symptoms of edema.  · You have sudden and unexplained weight gain.  Get help right away if:  · You develop shortness of breath or chest pain.  · You cannot breathe when you lie down.  · You develop pain, redness, or warmth in the swollen areas.  · You have heart, liver, or kidney disease and suddenly get edema.  · You have a fever and your symptoms suddenly get worse.  Summary  · Edema is an abnormal buildup of fluids in the body tissues and under the skin.  · Eating too much salt (sodium) and being on your feet or sitting for a long time can cause edema in your legs, feet, and ankles.  · Keep the affected body part raised (elevated) above the level of your heart when you are sitting or lying down.  This information is not intended to replace advice given to you by your health care provider.  Make sure you discuss any questions you have with your health care provider.  Document Revised: 05/06/2020 Document Reviewed: 01/20/2018  [employer] Patient Education © 2021 [employer]

## 2021-03-18 NOTE — PROGRESS NOTES
Subjective   Kate Macias is a 80 y.o. female     Chief Complaint   Patient presents with   • Follow-up   • Coronary Artery Disease       HPI    Problem list:    1.  CAD  1.1 left heart cath 4/2017 - LVEF 20 to 25%, 60% napkin ring stenosis of the left circumflex  2.  Cardiomyopathy  2.1 echo 3/24/17-mitral regurgitation, moderate to severe, EF 20 to 25%  2.2 echo 10/16/18-LVEF 45 to 50%, mildly increased with severe left ventricular diastolic dysfunction, global hypokinesis, left atrium is mildly dilated, moderate posterior annular calcification the mitral valve, mild mitral regurgitation, RVSP is 20 mmHg, pericardial effusion of small size  2.3 echocardiogram 6/28/19-LVEF 40 to 45%, lateral wall hypokinesis, diastolic dysfunction 2, mild MR, trivial to mild TR, small posterior lateral pericardial effusion without tamponade  2.4 echo 1/6/2021-EF 40 to 45% with 3D EF of 46%; mild LVH, diastolic dysfunction 1, eccentric jet of mitral regurgitation, physiological TR, posterior lateral pericardial effusion without tamponade  3.  Hypertension  4.  Hyperlipidemia  5.  Palpitations  6.  Shortness of breath  7.  Arthritis  8.  Glaucoma  9.  Early family history of coronary artery disease  10.  Carotid artery disease  10.1 carotid artery ultrasound-16 to 49% stenosis mid and distal left internal carotid artery, mild bifurcation disease but more than the left with 16-49 stenosis bilaterally, antegrade flow both vertebral arteries    Patient is an 80-year-old female who presents today for follow-up on echo with her  at her side.  She denies any chest pain, pressure, palpitations, fluttering, dizziness, presyncope, syncope, orthopnea or PND.  She says she does typically have some swelling in her ankles.  She says she only gets short of breath if she goes up a lot of stairs.  She says her cough is just from postnasal drip.  She says that she overall feels very good and was very pleased with her echo findings.    We  went back over echo.  Patient previously wanted to go back on Entresto which she is able to now however she wants to stay on lisinopril as Entresto is still going to be $50 a month for her.    Current Outpatient Medications on File Prior to Visit   Medication Sig Dispense Refill   • aspirin 81 MG EC tablet Take 81 mg by mouth Daily.     • atorvastatin (LIPITOR) 40 MG tablet Take 40 mg by mouth Daily.     • B Complex Vitamins (VITAMIN B COMPLEX) capsule capsule Take  by mouth Daily.     • Calcium-Magnesium-Vitamin D (CITRACAL CALCIUM+D PO) Take  by mouth.     • Fexofenadine HCl (ALLEGRA ALLERGY PO) Take  by mouth.     • furosemide (LASIX) 20 MG tablet Take 20 mg by mouth Daily.     • latanoprost (XALATAN) 0.005 % ophthalmic solution Take As Directed. 1 drop in each eye, per day     • lisinopril (PRINIVIL,ZESTRIL) 20 MG tablet Take 20 mg by mouth Daily.     • MELATONIN PO Take  by mouth.     • metoprolol succinate XL (TOPROL-XL) 50 MG 24 hr tablet TAKE 1 TABLET BY MOUTH DAILY 90 tablet 3   • Multiple Vitamins-Minerals (MULTIVITAMIN WOMEN 50+ PO) Take  by mouth.     • potassium chloride (K-DUR) 10 MEQ CR tablet Take 1 tablet by mouth Daily.  1     No current facility-administered medications on file prior to visit.       ALLERGIES    Patient has no known allergies.    Past Medical History:   Diagnosis Date   • Arthritis    • CHF (congestive heart failure) (CMS/[facility])    • COVID-19 vaccine administered 02/20/2021    1st shot / 2nd vaccine 03/24/2021 Walter Desouza.    • DDD (degenerative disc disease), cervical    • DDD (degenerative disc disease), lumbar    • DDD (degenerative disc disease), thoracic    • Ejection fraction < 50%    • Enlarged heart    • Glaucoma    • Hyperlipidemia    • Hypertension    • Left ventricular hypertrophy due to hypertensive disease    • Neck pain     reverse curvature of spine in neck        Social History     Socioeconomic History   • Marital status:      Spouse name: Not on file   •  Number of children: Not on file   • Years of education: Not on file   • Highest education level: Not on file   Tobacco Use   • Smoking status: Former Smoker     Packs/day: 0.50     Years: 15.00     Pack years: 7.50     Types: Cigarettes   • Smokeless tobacco: Never Used   Substance and Sexual Activity   • Alcohol use: No   • Drug use: Never   • Sexual activity: Defer       Family History   Problem Relation Age of Onset   • COPD Mother    • Macular degeneration Mother    • Blindness Mother    • No Known Problems Father    • Heart attack Brother        Review of Systems   Constitutional: Negative for appetite change, chills, fatigue and fever.   HENT: Positive for postnasal drip and rhinorrhea (clear ). Negative for congestion and sore throat.    Eyes: Positive for visual disturbance (glasses).   Respiratory: Positive for cough (dry ) and shortness of breath (if she goes up stairs ). Negative for chest tightness and wheezing.    Cardiovascular: Positive for leg swelling (sometimes her ankles ). Negative for chest pain and palpitations.   Gastrointestinal: Negative for abdominal pain, blood in stool, constipation, diarrhea, nausea and vomiting.   Endocrine: Negative for cold intolerance and heat intolerance.   Genitourinary: Positive for frequency (due to her taking lasix ). Negative for difficulty urinating, dysuria, hematuria and urgency.   Musculoskeletal: Positive for arthralgias, back pain (pt has history of arthritis and disc ), joint swelling (arthritis in her fingers ) and neck pain (back of her neck ). Negative for neck stiffness.   Skin: Negative for rash and wound.   Allergic/Immunologic: Positive for environmental allergies. Negative for food allergies.   Neurological: Positive for weakness (right shoulder ) and headaches (sinuis ). Negative for dizziness, light-headedness and numbness.   Hematological: Bruises/bleeds easily (both ).   Psychiatric/Behavioral: Positive for sleep disturbance (hard to go and  "hard to stay asleep she takes meds to help ).       Objective   /78 (BP Location: Left arm)   Pulse 60   Temp 97.8 °F (36.6 °C)   Ht 160 cm (63\")   Wt 92.1 kg (203 lb)   SpO2 94%   BMI 35.96 kg/m²   Vitals:    03/18/21 0909   BP: 127/78   BP Location: Left arm   Pulse: 60   Temp: 97.8 °F (36.6 °C)   SpO2: 94%   Weight: 92.1 kg (203 lb)   Height: 160 cm (63\")      Lab Results (most recent)     None        Physical Exam  Vitals reviewed.   Constitutional:       General: She is awake.      Appearance: Normal appearance. She is well-developed and well-groomed. She is obese.   HENT:      Head: Normocephalic.   Eyes:      General: Lids are normal.      Comments: Wears glasses   Neck:      Vascular: No carotid bruit, hepatojugular reflux or JVD.   Cardiovascular:      Rate and Rhythm: Normal rate and regular rhythm.      Pulses:           Radial pulses are 2+ on the right side and 2+ on the left side.        Dorsalis pedis pulses are 2+ on the right side and 2+ on the left side.        Posterior tibial pulses are 2+ on the right side and 2+ on the left side.      Heart sounds: Normal heart sounds.   Pulmonary:      Effort: Pulmonary effort is normal.      Breath sounds: Normal breath sounds and air entry.   Abdominal:      General: Bowel sounds are normal.      Palpations: Abdomen is soft.   Musculoskeletal:      Right lower leg: Edema (trace ) present.      Left lower leg: Edema (trace) present.   Skin:     General: Skin is warm and dry.   Neurological:      Mental Status: She is alert and oriented to person, place, and time.   Psychiatric:         Attention and Perception: Attention and perception normal.         Mood and Affect: Mood and affect normal.         Speech: Speech normal.         Behavior: Behavior is cooperative.         Thought Content: Thought content normal.         Cognition and Memory: Cognition and memory normal.         Judgment: Judgment normal.         Procedure   Procedures   "       Assessment/Plan      Diagnosis Plan   1. Dilated cardiomyopathy (CMS/[facility])     2. Coronary artery disease involving native coronary artery of native heart without angina pectoris     3. Essential hypertension     4. Shortness of breath     5. Morbidly obese (CMS/HCC)     6. Peripheral edema     7. Grade I diastolic dysfunction         Return in about 6 months (around 9/18/2021).    Dilated cardiomyopathy-patient is on ACE, beta and diuretic.  CAD-patient is on aspirin, statin and beta.  Hypertension-patient's doing very well on lisinopril and metoprolol.  Shortness of breath-stable.  Morbid obesity-patient provided educational material.  Peripheral edema/diastolic dysfunction-patient is on Lasix.  She will continue her medication regimen.  She will follow-up in 6 months or sooner if any changes.       Kate Macias  reports that she has quit smoking. Her smoking use included cigarettes. She has a 7.50 pack-year smoking history. She has never used smokeless tobacco..       Patient's Body mass index is 35.96 kg/m². BMI is above normal parameters. Recommendations include: educational material. Advance Care Planning   ACP discussion was declined by the patient. Patient has an advance directive (not in EMR), copy requested.    Electronically signed by:

## 2021-04-27 DIAGNOSIS — I42.0 DILATED CARDIOMYOPATHY (HCC): Primary | ICD-10-CM

## 2021-04-27 DIAGNOSIS — I51.89 GRADE I DIASTOLIC DYSFUNCTION: ICD-10-CM

## 2021-04-27 NOTE — TELEPHONE ENCOUNTER
"Pt LVM stating that TW was going to send in Entresto instead of Lisinopril. Stated she needs it send to [facility].       Per chart review, OV note from 11/23/20 states:  \"She will follow-up in 12 weeks or sooner if any changes.  If EF is 40% or less we will try to get her reapproved for Entresto.\"  Pt's EF on 1/6/21 had an EF of 40-45%. Entresto not mentioned in OV note on 3/18/21.   "

## 2021-04-27 NOTE — TELEPHONE ENCOUNTER
Susan Rankin APRN [place], Emily  Caller: Unspecified (Today,  9:25 AM)  You can send it in.  Please have her stop the lisinopril for 3 days and then start the entresto on day 4 24/26 mg PO BID.  Have her get a BMP in 1 week.         Called and informed pt of the above, she verbalized understanding.

## 2021-05-04 ENCOUNTER — PRIOR AUTHORIZATION (OUTPATIENT)
Dept: CARDIOLOGY | Facility: CLINIC | Age: 81
End: 2021-05-04

## 2021-05-04 NOTE — TELEPHONE ENCOUNTER
Entresto PA Case: [id number]  Status: Approved  Coverage Starts on: 1/1/2021  Coverage Ends on: 12/31/2021  Kayleen Broderick MA

## 2021-05-06 DIAGNOSIS — I42.0 DILATED CARDIOMYOPATHY (HCC): ICD-10-CM

## 2021-05-06 DIAGNOSIS — I51.89 GRADE I DIASTOLIC DYSFUNCTION: ICD-10-CM

## 2021-05-06 NOTE — TELEPHONE ENCOUNTER
Called to inform Pt regarding her Pre Auth Entresto 24/26 mg has been approved until 12/31/2021 . Pt ask for meds to be sent to [facility] in [place]. She will D/C the Lisinopril       TIM Arredondo

## 2021-09-21 ENCOUNTER — OFFICE VISIT (OUTPATIENT)
Dept: CARDIOLOGY | Facility: CLINIC | Age: 81
End: 2021-09-21

## 2021-09-21 VITALS
HEART RATE: 83 BPM | DIASTOLIC BLOOD PRESSURE: 79 MMHG | BODY MASS INDEX: 34.73 KG/M2 | HEIGHT: 63 IN | OXYGEN SATURATION: 96 % | WEIGHT: 196 LBS | SYSTOLIC BLOOD PRESSURE: 129 MMHG | TEMPERATURE: 97.1 F

## 2021-09-21 DIAGNOSIS — R07.89 CHEST TIGHTNESS: ICD-10-CM

## 2021-09-21 DIAGNOSIS — I42.0 DILATED CARDIOMYOPATHY (HCC): ICD-10-CM

## 2021-09-21 DIAGNOSIS — I10 ESSENTIAL HYPERTENSION: ICD-10-CM

## 2021-09-21 DIAGNOSIS — R06.02 SHORTNESS OF BREATH: ICD-10-CM

## 2021-09-21 DIAGNOSIS — R60.9 PERIPHERAL EDEMA: ICD-10-CM

## 2021-09-21 DIAGNOSIS — I51.89 GRADE I DIASTOLIC DYSFUNCTION: ICD-10-CM

## 2021-09-21 DIAGNOSIS — I25.10 CORONARY ARTERY DISEASE INVOLVING NATIVE CORONARY ARTERY OF NATIVE HEART WITHOUT ANGINA PECTORIS: Primary | ICD-10-CM

## 2021-09-21 DIAGNOSIS — E78.5 HYPERLIPIDEMIA, UNSPECIFIED HYPERLIPIDEMIA TYPE: ICD-10-CM

## 2021-09-21 DIAGNOSIS — I65.23 BILATERAL CAROTID ARTERY STENOSIS: ICD-10-CM

## 2021-09-21 PROCEDURE — 99214 OFFICE O/P EST MOD 30 MIN: CPT | Performed by: NURSE PRACTITIONER

## 2021-09-21 NOTE — PROGRESS NOTES
Subjective   Kate Macias is a 81 y.o. female     Chief Complaint   Patient presents with   • Follow-up   • Cardiomyopathy       HPI    Problem list:    1.  CAD  1.1 left heart cath 4/2017 - LVEF 20 to 25%, 60% napkin ring stenosis of the left circumflex  2.  Cardiomyopathy  2.1 echo 3/24/17-mitral regurgitation, moderate to severe, EF 20 to 25%  2.2 echo 10/16/18-LVEF 45 to 50%, mildly increased with severe left ventricular diastolic dysfunction, global hypokinesis, left atrium is mildly dilated, moderate posterior annular calcification the mitral valve, mild mitral regurgitation, RVSP is 20 mmHg, pericardial effusion of small size  2.3 echocardiogram 6/28/19-LVEF 40 to 45%, lateral wall hypokinesis, diastolic dysfunction 2, mild MR, trivial to mild TR, small posterior lateral pericardial effusion without tamponade  2.4 echo 1/6/2021-EF 40 to 45% with 3D EF of 46%; mild LVH, diastolic dysfunction 1, eccentric jet of mitral regurgitation, physiological TR, posterior lateral pericardial effusion without tamponade  3.  Hypertension  4.  Hyperlipidemia  5.  Palpitations  6.  Shortness of breath  7.  Arthritis  8.  Glaucoma  9.  Early family history of coronary artery disease  10.  Carotid artery disease  10.1 carotid artery ultrasound 6/18/2019-16 to 49% stenosis mid and distal left internal carotid artery, mild bifurcation disease but more than the left with 16-49 stenosis bilaterally, antegrade flow both vertebral arteries    FLOYD is an 81-year-old female presents today for follow-up with her  at her side.  She denies any chest pain or pressure.  She says she has been having a sinus infection so she has had a little bit of congestion and notes because some chest tightness.  She denies any palpitations, fluttering, dizziness, presyncope, syncope, orthopnea or PND.  She says she does get swelling in her legs and she takes her Lasix every day.  She does have shortness of breath but only if she goes upstairs  or really exerts herself.  She says she is fatigued a bit but she is stressed a lot.  She is doing well so far with Entresto.    Current Outpatient Medications on File Prior to Visit   Medication Sig Dispense Refill   • aspirin 81 MG EC tablet Take 81 mg by mouth Daily.     • atorvastatin (LIPITOR) 40 MG tablet Take 40 mg by mouth Daily.     • B Complex Vitamins (VITAMIN B COMPLEX) capsule capsule Take  by mouth Daily.     • Fexofenadine HCl (ALLEGRA ALLERGY PO) Take  by mouth.     • furosemide (LASIX) 20 MG tablet Take 20 mg by mouth Daily.     • latanoprost (XALATAN) 0.005 % ophthalmic solution Take As Directed. 1 drop in each eye, per day     • MELATONIN PO Take 10 mg by mouth Every Night.     • metoprolol succinate XL (TOPROL-XL) 50 MG 24 hr tablet TAKE 1 TABLET BY MOUTH DAILY 90 tablet 3   • Multiple Vitamins-Minerals (MULTIVITAMIN WOMEN 50+ PO) Take  by mouth.     • potassium chloride (K-DUR) 10 MEQ CR tablet Take 1 tablet by mouth Daily.  1   • sacubitril-valsartan (ENTRESTO) 24-26 MG tablet Take 1 tablet by mouth 2 (Two) Times a Day. (start after being off Lisinopril for 3 days) 60 tablet 11   • [DISCONTINUED] Calcium-Magnesium-Vitamin D (CITRACAL CALCIUM+D PO) Take  by mouth.       No current facility-administered medications on file prior to visit.       ALLERGIES    Patient has no known allergies.    Past Medical History:   Diagnosis Date   • Arthritis    • CHF (congestive heart failure) (CMS/[facility])    • COVID-19 vaccine administered 02/20/2021    1st shot / 2nd vaccine 03/24/2021 Walter Desouza.    • DDD (degenerative disc disease), cervical    • DDD (degenerative disc disease), lumbar    • DDD (degenerative disc disease), thoracic    • Ejection fraction < 50%    • Enlarged heart    • Glaucoma    • Hyperlipidemia    • Hypertension    • Left ventricular hypertrophy due to hypertensive disease    • Neck pain     reverse curvature of spine in neck        Social History     Socioeconomic History   • Marital  status:      Spouse name: Not on file   • Number of children: Not on file   • Years of education: Not on file   • Highest education level: Not on file   Tobacco Use   • Smoking status: Former Smoker     Packs/day: 0.50     Years: 15.00     Pack years: 7.50     Types: Cigarettes   • Smokeless tobacco: Never Used   Substance and Sexual Activity   • Alcohol use: No   • Drug use: Never   • Sexual activity: Defer       Family History   Problem Relation Age of Onset   • COPD Mother    • Macular degeneration Mother    • Blindness Mother    • No Known Problems Father    • Heart attack Brother        Review of Systems   Constitutional: Positive for fatigue (tired all the time due to alot of stress ). Negative for appetite change, chills, diaphoresis and fever.   HENT: Positive for congestion (nasal , head and chest  due to allergies ) and rhinorrhea (clear). Negative for sore throat.    Eyes: Positive for visual disturbance (glasses).   Respiratory: Positive for cough (allergies and sinuis ), chest tightness (due to congestion ), shortness of breath (going up stairs ) and wheezing (due to allergies ).    Cardiovascular: Positive for leg swelling (legs , feet and ankles ( some) swelling goes down at night and when she put her legs up; takes lasix everyday ). Negative for chest pain and palpitations.   Gastrointestinal: Negative for abdominal pain, blood in stool, constipation, diarrhea, nausea and vomiting.   Endocrine: Positive for heat intolerance ( head sweating this am ) . Negative for cold intolerance.   Genitourinary: Positive for frequency (due to taking a water pill ) and hematuria (it has been over 20 years ). Negative for difficulty urinating, dysuria and urgency.   Musculoskeletal: Positive for arthralgias (through out the body ), back pain (entire back ), neck pain (both sides and the back of the neck ) and neck stiffness (hard to look behind her ). Negative for joint swelling.   Skin: Negative for color  "change, pallor, rash and wound.   Allergic/Immunologic: Positive for environmental allergies (seasonal ). Negative for food allergies.   Neurological: Positive for weakness (she states she isnt has strong has she use to be ). Negative for dizziness, light-headedness, numbness and headaches.   Hematological: Bruises/bleeds easily (Bruises and bleeds ).   Psychiatric/Behavioral: Positive for sleep disturbance (hard to go to sleep she has meds to help with the sleep ).       Objective   /79 (BP Location: Left arm)   Pulse 83   Temp 97.1 °F (36.2 °C)   Ht 160 cm (63\")   Wt 88.9 kg (196 lb)   SpO2 96%   BMI 34.72 kg/m²   Vitals:    09/21/21 1014   BP: 129/79   BP Location: Left arm   Pulse: 83   Temp: 97.1 °F (36.2 °C)   SpO2: 96%   Weight: 88.9 kg (196 lb)   Height: 160 cm (63\")      Lab Results (most recent)     None        Physical Exam  Vitals reviewed.   Constitutional:       General: She is awake.      Appearance: Normal appearance. She is well-developed and well-groomed. She is obese.   HENT:      Head: Normocephalic.   Eyes:      General: Lids are normal.      Comments: Wears glasses    Neck:      Vascular: No carotid bruit, hepatojugular reflux or JVD.   Cardiovascular:      Rate and Rhythm: Normal rate and regular rhythm.      Pulses:           Radial pulses are 2+ on the right side and 2+ on the left side.        Dorsalis pedis pulses are 2+ on the right side and 2+ on the left side.        Posterior tibial pulses are 2+ on the right side and 2+ on the left side.      Heart sounds: Normal heart sounds.   Pulmonary:      Effort: Pulmonary effort is normal.      Breath sounds: Normal breath sounds and air entry.   Abdominal:      General: Bowel sounds are normal.      Palpations: Abdomen is soft.   Musculoskeletal:      Right lower leg: Edema (trace ) present.      Left lower leg: Edema (trace) present.   Skin:     General: Skin is warm and dry.   Neurological:      Mental Status: She is alert and " oriented to person, place, and time.   Psychiatric:         Attention and Perception: Attention and perception normal.         Mood and Affect: Mood and affect normal.         Speech: Speech normal.         Behavior: Behavior normal. Behavior is cooperative.         Thought Content: Thought content normal.         Cognition and Memory: Cognition and memory normal.         Judgment: Judgment normal.         Procedure   Procedures         Assessment/Plan      Diagnosis Plan   1. Coronary artery disease involving native coronary artery of native heart without angina pectoris     2. Dilated cardiomyopathy (CMS/HCC)     3. Essential hypertension     4. Grade I diastolic dysfunction     5. Hyperlipidemia, unspecified hyperlipidemia type     6. Bilateral carotid artery stenosis     7. Shortness of breath     8. Peripheral edema     9. Chest tightness         Return in about 6 months (around 3/21/2022).    CAD-patient is on aspirin, statin and beta.  Dilated cardiomyopathy/diastolic dysfunction-peripheral edema-patient is on beta-blocker and Entresto as well as diuretic.  Hypertension-patient's doing well with beta-blocker and Entresto.  Hyperlipidemia-patient's on Lipitor and her last LDL was 35.  Bilateral carotid artery disease-patient's on aspirin and statin.  Shortness of breath-stable.  Chest tightness-patient says this is directly related to her congestion.  She will continue her medication regimen.  She will follow-up in 6 months or sooner if any changes.  When she follows up we will repeat an echocardiogram to reassess EF.       Kate Macias  reports that she has quit smoking. Her smoking use included cigarettes. She has a 7.50 pack-year smoking history. She has never used smokeless tobacco..  Advance Care Planning   ACP discussion was declined by the patient. Patient has an advance directive (not in EMR), copy requested. Patient did not bring med list or medicine bottles to appointment, med list has been  reviewed and updated based on patient's knowledge of their meds.     Electronically signed by:

## 2021-09-21 NOTE — PATIENT INSTRUCTIONS
Advance Care Planning and Advance Directives     You make decisions on a daily basis - decisions about where you want to live, your career, your home, your life. Perhaps one of the most important decisions you face is your choice for future medical care. Take time to talk with your family and your healthcare team and start planning today.  Advance Care Planning is a process that can help you:  · Understand possible future healthcare decisions in light of your own experiences  · Reflect on those decision in light of your goals and values  · Discuss your decisions with those closest to you and the healthcare professionals that care for you  · Make a plan by creating a document that reflects your wishes    Surrogate Decision Maker  In the event of a medical emergency, which has left you unable to communicate or to make your own decisions, you would need someone to make decisions for you.  It is important to discuss your preferences for medical treatment with this person while you are in good health.     Qualities of a surrogate decision maker:  • Willing to take on this role and responsibility  • Knows what you want for future medical care  • Willing to follow your wishes even if they don't agree with them  • Able to make difficult medical decisions under stressful circumstances    Advance Directives  These are legal documents you can create that will guide your healthcare team and decision maker(s) when needed. These documents can be stored in the electronic medical record.    · Living Will - a legal document to guide your care if you have a terminal condition or a serious illness and are unable to communicate. States vary by statute in document names/types, but most forms may include one or more of the following:        -  Directions regarding life-prolonging treatments        -  Directions regarding artificially provided nutrition/hydration        -  Choosing a healthcare decision maker        -  Direction  regarding organ/tissue donation    · Durable Power of  for Healthcare - this document names an -in-fact to make medical decisions for you, but it may also allow this person to make personal and financial decisions for you. Please seek the advice of an  if you need this type of document.    **Advance Directives are not required and no one may discriminate against you if you do not sign one.    Medical Orders  Many states allow specific forms/orders signed by your physician to record your wishes for medical treatment in your current state of health. This form, signed in personal communication with your physician, addresses resuscitation and other medical interventions that you may or may not want.      For more information or to schedule a time with a [facility] Advance Care Planning Facilitator contact: [email or web address] or call [phone] and someone will contact you directly.

## 2022-01-05 RX ORDER — METOPROLOL SUCCINATE 50 MG/1
50 TABLET, EXTENDED RELEASE ORAL DAILY
Qty: 90 TABLET | Refills: 3 | Status: SHIPPED | OUTPATIENT
Start: 2022-01-05 | End: 2023-01-04 | Stop reason: SDUPTHER

## 2022-04-27 ENCOUNTER — TELEPHONE (OUTPATIENT)
Dept: CARDIOLOGY | Facility: CLINIC | Age: 82
End: 2022-04-27

## 2022-07-28 ENCOUNTER — OFFICE VISIT (OUTPATIENT)
Dept: CARDIOLOGY | Facility: CLINIC | Age: 82
End: 2022-07-28

## 2022-07-28 VITALS
SYSTOLIC BLOOD PRESSURE: 128 MMHG | WEIGHT: 185 LBS | TEMPERATURE: 97.6 F | HEIGHT: 63 IN | HEART RATE: 67 BPM | BODY MASS INDEX: 32.78 KG/M2 | OXYGEN SATURATION: 97 % | DIASTOLIC BLOOD PRESSURE: 75 MMHG

## 2022-07-28 DIAGNOSIS — I65.23 BILATERAL CAROTID ARTERY STENOSIS: ICD-10-CM

## 2022-07-28 DIAGNOSIS — I42.0 DILATED CARDIOMYOPATHY: ICD-10-CM

## 2022-07-28 DIAGNOSIS — I51.89 GRADE I DIASTOLIC DYSFUNCTION: ICD-10-CM

## 2022-07-28 DIAGNOSIS — R60.9 PERIPHERAL EDEMA: ICD-10-CM

## 2022-07-28 DIAGNOSIS — R06.02 SHORTNESS OF BREATH: ICD-10-CM

## 2022-07-28 DIAGNOSIS — E78.5 HYPERLIPIDEMIA, UNSPECIFIED HYPERLIPIDEMIA TYPE: ICD-10-CM

## 2022-07-28 DIAGNOSIS — I10 ESSENTIAL HYPERTENSION: ICD-10-CM

## 2022-07-28 DIAGNOSIS — I25.10 CORONARY ARTERY DISEASE INVOLVING NATIVE CORONARY ARTERY OF NATIVE HEART WITHOUT ANGINA PECTORIS: Primary | ICD-10-CM

## 2022-07-28 PROCEDURE — 99214 OFFICE O/P EST MOD 30 MIN: CPT | Performed by: NURSE PRACTITIONER

## 2022-07-28 PROCEDURE — 93000 ELECTROCARDIOGRAM COMPLETE: CPT | Performed by: NURSE PRACTITIONER

## 2022-07-28 NOTE — PROGRESS NOTES
Subjective   Kate Macias is a 82 y.o. female     Chief Complaint   Patient presents with   • Follow-up       HPI    Problem list:    1.  CAD  1.1 left heart cath 4/2017 - LVEF 20 to 25%, 60% napkin ring stenosis of the left circumflex  2.  Cardiomyopathy  2.1 echo 3/24/17-mitral regurgitation, moderate to severe, EF 20 to 25%  2.2 echo 10/16/18-LVEF 45 to 50%, mildly increased with severe left ventricular diastolic dysfunction, global hypokinesis, left atrium is mildly dilated, moderate posterior annular calcification the mitral valve, mild mitral regurgitation, RVSP is 20 mmHg, pericardial effusion of small size  2.3 echocardiogram 6/28/19-LVEF 40 to 45%, lateral wall hypokinesis, diastolic dysfunction 2, mild MR, trivial to mild TR, small posterior lateral pericardial effusion without tamponade  2.4 echo 1/6/2021-EF 40 to 45% with 3D EF of 46%; mild LVH, diastolic dysfunction 1, eccentric jet of mitral regurgitation, physiological TR, posterior lateral pericardial effusion without tamponade  3.  Hypertension  4.  Hyperlipidemia  5.  Palpitations  6.  Shortness of breath  7.  Arthritis  8.  Glaucoma  9.  Early family history of coronary artery disease  10.  Carotid artery disease  10.1 carotid artery ultrasound 6/18/2019-16 to 49% stenosis mid and distal left internal carotid artery, mild bifurcation disease but more than the left with 16-49 stenosis bilaterally, antegrade flow both vertebral arteries    Patient is an 82-year-old female who presents today for follow-up.  Patient was in the hospital for about a month with rehab after falling at home and fracturing her right hip.  She says she was at home with her  who has advanced Alzheimer's.  She was trying to explain to him to get the phone but he said he could not find it.  She says it took her 2 days and finally poor self to her iPad where she was able to message her niece and have her call 911.  She denies any chest pain, pressure, palpitations,  fluttering, dizziness, presyncope, syncope, orthopnea or PND.  She does get some swelling in her legs but she says that since having her right hip replacement.  She does have shortness of breath when she climbs stairs but she says she notices that she holds her breath.    We will order an echo and that she had 1 when she was in the hospital with her hip replacement.      Current Outpatient Medications on File Prior to Visit   Medication Sig Dispense Refill   • aspirin 81 MG EC tablet Take 81 mg by mouth Daily.     • atorvastatin (LIPITOR) 40 MG tablet Take 40 mg by mouth Daily.     • B Complex Vitamins (VITAMIN B COMPLEX) capsule capsule Take  by mouth Daily.     • Fexofenadine HCl (ALLEGRA ALLERGY PO) Take  by mouth.     • furosemide (LASIX) 20 MG tablet Take 20 mg by mouth Daily.     • latanoprost (XALATAN) 0.005 % ophthalmic solution Take As Directed. 1 drop in each eye, per day     • MELATONIN PO Take 10 mg by mouth Every Night.     • metoprolol succinate XL (TOPROL-XL) 50 MG 24 hr tablet TAKE 1 TABLET BY MOUTH DAILY 90 tablet 3   • Multiple Vitamins-Minerals (MULTIVITAMIN WOMEN 50+ PO) Take  by mouth.     • potassium chloride (K-DUR) 10 MEQ CR tablet Take 1 tablet by mouth Daily.  1   • sacubitril-valsartan (ENTRESTO) 24-26 MG tablet Take 1 tablet by mouth 2 (Two) Times a Day. (start after being off Lisinopril for 3 days) 60 tablet 11     No current facility-administered medications on file prior to visit.       ALLERGIES    Patient has no known allergies.    Past Medical History:   Diagnosis Date   • Arthritis    • CHF (congestive heart failure) ([facility])    • COVID-19 vaccine administered 02/20/2021    1st shot / 2nd vaccine 03/24/2021 Walter Desouza. 11/30/2021   • DDD (degenerative disc disease), cervical    • DDD (degenerative disc disease), lumbar    • DDD (degenerative disc disease), thoracic    • Ejection fraction < 50%    • Enlarged heart    • Glaucoma    • Hyperlipidemia    • Hypertension    • Left  ventricular hypertrophy due to hypertensive disease    • Neck pain     reverse curvature of spine in neck        Social History     Socioeconomic History   • Marital status:    Tobacco Use   • Smoking status: Former Smoker     Packs/day: 0.50     Years: 15.00     Pack years: 7.50     Types: Cigarettes   • Smokeless tobacco: Never Used   Vaping Use   • Vaping Use: Never used   Substance and Sexual Activity   • Alcohol use: No   • Drug use: Never   • Sexual activity: Defer       Family History   Problem Relation Age of Onset   • COPD Mother    • Macular degeneration Mother    • Blindness Mother    • No Known Problems Father    • Heart attack Brother        Review of Systems   Constitutional: Positive for fatigue (fatigued due to not sleeping at night ). Negative for appetite change, chills, diaphoresis and fever.   HENT: Negative for congestion, rhinorrhea and sore throat.    Eyes: Positive for visual disturbance (glasses).   Respiratory: Positive for shortness of breath (if she climbs stairs ). Negative for cough, chest tightness and wheezing.    Cardiovascular: Positive for leg swelling (legs, feet and ankles due to having sx she states that the fluid is coming off; will elevate and takes water pill daily ). Negative for chest pain and palpitations.   Gastrointestinal: Negative for abdominal pain, blood in stool, constipation, diarrhea, nausea and vomiting.   Endocrine: Negative for cold intolerance and heat intolerance.   Genitourinary: Negative for difficulty urinating, dysuria, frequency, hematuria and urgency.   Musculoskeletal: Positive for arthralgias (throughout the body ), back pain (upper, lower and middle of the back ) and gait problem (uses a cane ). Negative for joint swelling, neck pain and neck stiffness.        Fx'd right hip when she fell over A/C cord; took her a couple of days to get to her computer to get 911 on phone[facility]    Skin: Negative for color change, pallor, rash and  "wound.   Allergic/Immunologic: Positive for environmental allergies (seasonal ). Negative for food allergies.   Neurological: Positive for weakness (right leg weakness due to surgery she is using a cane to get around ). Negative for dizziness, light-headedness, numbness and headaches.   Hematological: Bruises/bleeds easily (Brusies and bleeds easy ).       Objective   /75 (BP Location: Left arm, Patient Position: Sitting)   Pulse 67   Temp 97.6 °F (36.4 °C)   Ht 160 cm (63\")   Wt 83.9 kg (185 lb)   SpO2 97%   BMI 32.77 kg/m²   Vitals:    07/28/22 1252   BP: 128/75   BP Location: Left arm   Patient Position: Sitting   Pulse: 67   Temp: 97.6 °F (36.4 °C)   SpO2: 97%   Weight: 83.9 kg (185 lb)   Height: 160 cm (63\")      Lab Results (most recent)     None        Physical Exam  Vitals reviewed.   Constitutional:       General: She is awake.      Appearance: Normal appearance. She is well-developed and well-groomed. She is obese.   HENT:      Head: Normocephalic.   Eyes:      General: Lids are normal.      Comments: Wears glasses    Neck:      Vascular: No carotid bruit, hepatojugular reflux or JVD.   Cardiovascular:      Rate and Rhythm: Normal rate and regular rhythm.      Pulses:           Dorsalis pedis pulses are 2+ on the right side and 2+ on the left side.        Posterior tibial pulses are 2+ on the right side and 2+ on the left side.      Heart sounds: Normal heart sounds.   Pulmonary:      Effort: Pulmonary effort is normal.      Breath sounds: Normal breath sounds and air entry.   Abdominal:      General: Bowel sounds are normal.      Palpations: Abdomen is soft.   Musculoskeletal:      Right lower leg: Edema (trace) present.      Left lower leg: Edema (trace) present.      Comments: Uses a cane    Skin:     General: Skin is warm and dry.   Neurological:      Mental Status: She is alert and oriented to person, place, and time.   Psychiatric:         Attention and Perception: Attention and " perception normal.         Mood and Affect: Mood and affect normal.         Speech: Speech normal.         Behavior: Behavior normal. Behavior is cooperative.         Thought Content: Thought content normal.         Cognition and Memory: Cognition and memory normal.         Judgment: Judgment normal.         Procedure     ECG 12 Lead    Date/Time: 7/28/2022 1:07 PM  Performed by: Susan Rankin APRN  Authorized by: Susan Rankin APRN   Comparison: compared with previous ECG from 11/23/2020  Similar to previous ECG  Comparison to previous ECG: PVC today   Rhythm: sinus rhythm  Ectopy: infrequent PVCs  Rate: normal  BPM: 68  Conduction: non-specific intraventricular conduction delay  QRS axis: right  Other findings: right atrial abnormality    Clinical impression: abnormal EKG                 Assessment & Plan      Diagnosis Plan   1. Coronary artery disease involving native coronary artery of native heart without angina pectoris  ECG 12 Lead    Adult Transthoracic Echo Complete W/ Cont if Necessary Per Protocol   2. Dilated cardiomyopathy (HCC)  Adult Transthoracic Echo Complete W/ Cont if Necessary Per Protocol   3. Essential hypertension  ECG 12 Lead    Adult Transthoracic Echo Complete W/ Cont if Necessary Per Protocol   4. Grade I diastolic dysfunction  Adult Transthoracic Echo Complete W/ Cont if Necessary Per Protocol   5. Hyperlipidemia, unspecified hyperlipidemia type     6. Bilateral carotid artery stenosis     7. Shortness of breath  Adult Transthoracic Echo Complete W/ Cont if Necessary Per Protocol   8. Peripheral edema         Return in about 3 months (around 10/28/2022).    CAD - patient on ASA, statin and beta.  Dilated Cardiomyopathy/DD I/peripheral edema - patient on beta, entesto and diuretic.  Hypertension - doing well on beta and entresto.  Hyperlipidemia - on lipitor, req'd labs from PCP.  Carotid Artery disease - patient on ASA and statin.  CAD/dilated cardiomyopathy/hypertension/diastolic  dysfunction/shortness of breath-patient will get repeat echocardiogram.  I did review her hospital records when she fractured her hip and they did not perform an echo so we will going do that to reassess her EF status post being on beta and Entresto.  She will continue her medication regimen for now.  She will follow-up in 3 months or sooner if any changes or abnormalities with echo.       Kate Macias  reports that she has quit smoking. Her smoking use included cigarettes. She has a 7.50 pack-year smoking history. She has never used smokeless tobacco..Advance Care Planning   ACP discussion was declined by the patient. Patient has an advance directive (not in EMR), copy requested. Patient brought in medicine list to appointment, it's been reviewed with patient and med list was updated in the chart.               Electronically signed by:

## 2022-08-31 ENCOUNTER — HOSPITAL ENCOUNTER (OUTPATIENT)
Dept: CARDIOLOGY | Facility: HOSPITAL | Age: 82
Discharge: HOME | End: 2022-08-31
Admitting: NURSE PRACTITIONER

## 2022-08-31 VITALS — WEIGHT: 184.97 LBS | HEIGHT: 63 IN | BODY MASS INDEX: 32.77 KG/M2

## 2022-08-31 DIAGNOSIS — R06.02 SHORTNESS OF BREATH: ICD-10-CM

## 2022-08-31 DIAGNOSIS — I10 ESSENTIAL HYPERTENSION: ICD-10-CM

## 2022-08-31 DIAGNOSIS — I42.0 DILATED CARDIOMYOPATHY: ICD-10-CM

## 2022-08-31 DIAGNOSIS — I25.10 CORONARY ARTERY DISEASE INVOLVING NATIVE CORONARY ARTERY OF NATIVE HEART WITHOUT ANGINA PECTORIS: ICD-10-CM

## 2022-08-31 DIAGNOSIS — I51.89 GRADE I DIASTOLIC DYSFUNCTION: ICD-10-CM

## 2022-08-31 PROCEDURE — 93306 TTE W/DOPPLER COMPLETE: CPT

## 2022-08-31 PROCEDURE — 93306 TTE W/DOPPLER COMPLETE: CPT | Performed by: INTERNAL MEDICINE

## 2022-09-12 ENCOUNTER — TELEPHONE (OUTPATIENT)
Dept: CARDIOLOGY | Facility: CLINIC | Age: 82
End: 2022-09-12

## 2022-09-12 DIAGNOSIS — I51.89 GRADE I DIASTOLIC DYSFUNCTION: ICD-10-CM

## 2022-09-12 DIAGNOSIS — I42.0 DILATED CARDIOMYOPATHY: ICD-10-CM

## 2022-09-12 LAB
AORTIC DIMENSIONLESS INDEX: 0.61 (DI)
BH CV ECHO MEAS - ACS: 1.82 CM
BH CV ECHO MEAS - AO MAX PG: 8.6 MMHG
BH CV ECHO MEAS - AO MEAN PG: 4.5 MMHG
BH CV ECHO MEAS - AO ROOT DIAM: 2.9 CM
BH CV ECHO MEAS - AO V2 MAX: 146.8 CM/SEC
BH CV ECHO MEAS - AO V2 VTI: 31.3 CM
BH CV ECHO MEAS - EDV(CUBED): 111.2 ML
BH CV ECHO MEAS - EDV(MOD-SP4): 123 ML
BH CV ECHO MEAS - EF(MOD-SP4): 56 %
BH CV ECHO MEAS - EF_3D-VOL: 60 %
BH CV ECHO MEAS - ESV(CUBED): 50 ML
BH CV ECHO MEAS - ESV(MOD-SP4): 54.1 ML
BH CV ECHO MEAS - FS: 23.4 %
BH CV ECHO MEAS - IVS/LVPW: 0.99 CM
BH CV ECHO MEAS - IVSD: 1.08 CM
BH CV ECHO MEAS - LA DIMENSION: 4.1 CM
BH CV ECHO MEAS - LAT PEAK E' VEL: 3.5 CM/SEC
BH CV ECHO MEAS - LV DIASTOLIC VOL/BSA (35-75): 65.7 CM2
BH CV ECHO MEAS - LV MASS(C)D: 192.4 GRAMS
BH CV ECHO MEAS - LV MAX PG: 3.3 MMHG
BH CV ECHO MEAS - LV MEAN PG: 1.54 MMHG
BH CV ECHO MEAS - LV SYSTOLIC VOL/BSA (12-30): 28.9 CM2
BH CV ECHO MEAS - LV V1 MAX: 91.2 CM/SEC
BH CV ECHO MEAS - LV V1 VTI: 19.1 CM
BH CV ECHO MEAS - LVIDD: 4.8 CM
BH CV ECHO MEAS - LVIDS: 3.7 CM
BH CV ECHO MEAS - LVPWD: 1.1 CM
BH CV ECHO MEAS - MED PEAK E' VEL: 3.6 CM/SEC
BH CV ECHO MEAS - MV A MAX VEL: 110.1 CM/SEC
BH CV ECHO MEAS - MV DEC SLOPE: 426.2 CM/SEC2
BH CV ECHO MEAS - MV DEC TIME: 0.25 MSEC
BH CV ECHO MEAS - MV E MAX VEL: 72.6 CM/SEC
BH CV ECHO MEAS - MV E/A: 0.66
BH CV ECHO MEAS - MV MAX PG: 8.4 MMHG
BH CV ECHO MEAS - MV MEAN PG: 3.6 MMHG
BH CV ECHO MEAS - MV P1/2T: 62.4 MSEC
BH CV ECHO MEAS - MV V2 VTI: 40.1 CM
BH CV ECHO MEAS - MVA(P1/2T): 3.5 CM2
BH CV ECHO MEAS - PA V2 MAX: 120.8 CM/SEC
BH CV ECHO MEAS - RAP SYSTOLE: 10 MMHG
BH CV ECHO MEAS - RV MAX PG: 2.6 MMHG
BH CV ECHO MEAS - RV V1 MAX: 81.1 CM/SEC
BH CV ECHO MEAS - RV V1 VTI: 18 CM
BH CV ECHO MEAS - RVDD: 2.31 CM
BH CV ECHO MEAS - RVSP: 31 MMHG
BH CV ECHO MEAS - SI(MOD-SP4): 36.8 ML/M2
BH CV ECHO MEAS - SV(MOD-SP4): 68.9 ML
BH CV ECHO MEAS - TAPSE (>1.6): 2.17 CM
BH CV ECHO MEAS - TR MAX PG: 21 MMHG
BH CV ECHO MEAS - TR MAX VEL: 228.9 CM/SEC
BH CV ECHO MEASUREMENTS AVERAGE E/E' RATIO: 20.45
BH CV XLRA - TDI S': 13.5 CM/SEC
IVRT: 116 MSEC
MAXIMAL PREDICTED HEART RATE: 138 BPM
SINUS: 2.7 CM
STRESS TARGET HR: 117 BPM

## 2022-09-12 NOTE — PROGRESS NOTES
Please advise patient.  EF within normal range, mild backflow/regurgitation in her mitral and tricuspid valves, trivial to small pericardial effusion, inflammation around lining of heart. (Typically treated with NSAIDs) No additional treatment at this time.  Will monitor.

## 2022-09-12 NOTE — TELEPHONE ENCOUNTER
----- Message from BRISA Valdivia sent at 9/12/2022  7:07 AM EDT -----  Please advise patient.  EF within normal range, mild backflow/regurgitation in her mitral and tricuspid valves, trivial to small pericardial effusion, inflammation around lining of heart. (Typically treated with NSAIDs) No additional treatment at this time.  Will monitor.         Pt was advised of the above mess /Echo results     Echo - ejection fraction appears to be 56 - 60%    Pt advised that if she needs to be seen sooner or starts to have complications she will let us know and we will get her in sooner . Pt states that she is under a lot of stress due to having to put her  in a Nursing Home and she has to drive 4 hrs each way       Raysa Everett , TIM

## 2022-11-10 ENCOUNTER — OFFICE VISIT (OUTPATIENT)
Dept: CARDIOLOGY | Facility: CLINIC | Age: 82
End: 2022-11-10

## 2022-11-10 VITALS
BODY MASS INDEX: 33.31 KG/M2 | WEIGHT: 188 LBS | HEIGHT: 63 IN | SYSTOLIC BLOOD PRESSURE: 124 MMHG | TEMPERATURE: 97.1 F | DIASTOLIC BLOOD PRESSURE: 76 MMHG | OXYGEN SATURATION: 98 % | HEART RATE: 78 BPM

## 2022-11-10 DIAGNOSIS — I42.0 DILATED CARDIOMYOPATHY: ICD-10-CM

## 2022-11-10 DIAGNOSIS — I25.10 CORONARY ARTERY DISEASE INVOLVING NATIVE CORONARY ARTERY OF NATIVE HEART WITHOUT ANGINA PECTORIS: Primary | ICD-10-CM

## 2022-11-10 DIAGNOSIS — E78.5 HYPERLIPIDEMIA, UNSPECIFIED HYPERLIPIDEMIA TYPE: ICD-10-CM

## 2022-11-10 DIAGNOSIS — R06.02 SHORTNESS OF BREATH: ICD-10-CM

## 2022-11-10 DIAGNOSIS — R00.2 PALPITATIONS: ICD-10-CM

## 2022-11-10 DIAGNOSIS — I10 ESSENTIAL HYPERTENSION: ICD-10-CM

## 2022-11-10 DIAGNOSIS — R60.9 PERIPHERAL EDEMA: ICD-10-CM

## 2022-11-10 DIAGNOSIS — I65.23 BILATERAL CAROTID ARTERY STENOSIS: ICD-10-CM

## 2022-11-10 DIAGNOSIS — I51.89 GRADE I DIASTOLIC DYSFUNCTION: ICD-10-CM

## 2022-11-10 PROCEDURE — 99214 OFFICE O/P EST MOD 30 MIN: CPT | Performed by: NURSE PRACTITIONER

## 2022-11-10 NOTE — PROGRESS NOTES
Subjective   Kate Macias is a 82 y.o. female     Chief Complaint   Patient presents with   • Follow-up       HPI    Problem list:    1.  CAD  1.1 left heart cath 4/2017 - LVEF 20 to 25%, 60% napkin ring stenosis of the left circumflex  2.  Cardiomyopathy  2.1 echo 3/24/17-mitral regurgitation, moderate to severe, EF 20 to 25%  2.2 echo 1/6/2021-EF 40 to 45% with 3D EF of 46%; mild LVH, diastolic dysfunction 1, eccentric jet of mitral regurgitation, physiological TR, posterior lateral pericardial effusion without tamponade  2.3 Echo 8/31/2022-EF 50 to 55%, diastolic dysfunction 1, mild MR, trivial to mild TR, trivial to small pericardial effusion, PA is in the low 30s  3.  Hypertension  4.  Hyperlipidemia  5.  Palpitations  6.  Shortness of breath  7.  Arthritis  8.  Glaucoma  9.  Early family history of coronary artery disease  10.  Carotid artery disease  10.1 carotid artery ultrasound 6/18/2019-16 to 49% stenosis mid and distal left internal carotid artery, mild bifurcation disease but more than the left with 16-49 stenosis bilaterally, antegrade flow both vertebral arteries    Patient is an 82-year-old female who presents today for a follow-up on testing.  She denies any chest pain or pressure.  She says she noticed palpitations whenever she drank a cup of coffee but that the only time she noticed it.  She denies any dizziness, presyncope, syncope, orthopnea, PND or edema.  She says she only has shortness of breath whenever she goes upstairs.  She had a right hip replaced and is still having some problems so she is back in physical therapy.  She became teary-eyed talking about her  has been in nursing home because his advanced Alzheimer's.    We have requested labs from PCP.    Current Outpatient Medications on File Prior to Visit   Medication Sig Dispense Refill   • aspirin 81 MG EC tablet Take 81 mg by mouth Daily.     • atorvastatin (LIPITOR) 40 MG tablet Take 40 mg by mouth Daily.     • B Complex  Vitamins (VITAMIN B COMPLEX) capsule capsule Take  by mouth Daily.     • Fexofenadine HCl (ALLEGRA ALLERGY PO) Take  by mouth.     • furosemide (LASIX) 20 MG tablet Take 20 mg by mouth Daily.     • latanoprost (XALATAN) 0.005 % ophthalmic solution Take As Directed. 1 drop in each eye, per day     • MELATONIN PO Take 10 mg by mouth Every Night.     • metoprolol succinate XL (TOPROL-XL) 50 MG 24 hr tablet TAKE 1 TABLET BY MOUTH DAILY 90 tablet 3   • Multiple Vitamins-Minerals (MULTIVITAMIN WOMEN 50+ PO) Take  by mouth.     • potassium chloride (K-DUR) 10 MEQ CR tablet Take 1 tablet by mouth Daily.  1   • sacubitril-valsartan (ENTRESTO) 24-26 MG tablet Take 1 tablet by mouth 2 (Two) Times a Day. (start after being off Lisinopril for 3 days) 60 tablet 11     No current facility-administered medications on file prior to visit.       ALLERGIES    Lisinopril    Past Medical History:   Diagnosis Date   • Arthritis    • CHF (congestive heart failure) ([facility])    • COVID-19 vaccine administered 02/20/2021    1st shot / 2nd vaccine 03/24/2021 [employer] Pfz. 11/30/2021   • DDD (degenerative disc disease), cervical    • DDD (degenerative disc disease), lumbar    • DDD (degenerative disc disease), thoracic    • Ejection fraction < 50%    • Enlarged heart    • Glaucoma    • Hyperlipidemia    • Hypertension    • Left ventricular hypertrophy due to hypertensive disease    • Neck pain     reverse curvature of spine in neck        Social History     Socioeconomic History   • Marital status:    Tobacco Use   • Smoking status: Former     Packs/day: 0.50     Years: 15.00     Pack years: 7.50     Types: Cigarettes   • Smokeless tobacco: Never   Vaping Use   • Vaping Use: Never used   Substance and Sexual Activity   • Alcohol use: No   • Drug use: Never   • Sexual activity: Defer       Family History   Problem Relation Age of Onset   • COPD Mother    • Macular degeneration Mother    • Blindness Mother    • No Known Problems Father    •  "Heart attack Brother        Review of Systems   Constitutional: Negative for appetite change, chills, diaphoresis, fatigue and fever.   HENT: Negative for congestion, rhinorrhea and sore throat.    Eyes: Positive for visual disturbance (glasses).   Respiratory: Positive for shortness of breath (going up steps ). Negative for cough, chest tightness and wheezing.    Cardiovascular: Positive for palpitations (when going up steps; had just had a cup of coffee when it happened). Negative for chest pain and leg swelling.   Gastrointestinal: Negative for abdominal pain, blood in stool, constipation, diarrhea, nausea and vomiting.   Endocrine: Negative for cold intolerance and heat intolerance.   Genitourinary: Positive for frequency (several times a night ( around 4-5 times ) ). Negative for difficulty urinating, dysuria, hematuria and urgency.   Musculoskeletal: Positive for arthralgias (throguhout the body ), back pain (lower back and hips ), gait problem (uses a cane ), neck pain (back of the neck ) and neck stiffness (stiffness at times ). Negative for joint swelling.   Skin: Negative for color change, pallor, rash and wound.   Allergic/Immunologic: Positive for environmental allergies (seasonal ). Negative for food allergies.   Neurological: Negative for dizziness, syncope, weakness, light-headedness, numbness and headaches.   Hematological: Bruises/bleeds easily (Bruseis and bleeds easy ).   Psychiatric/Behavioral: Positive for sleep disturbance (hard to stay asleep due to being up and down all night due to going to the bathroom ).       Objective   /76 (BP Location: Left arm, Patient Position: Sitting)   Pulse 78   Temp 97.1 °F (36.2 °C)   Ht 160 cm (63\")   Wt 85.3 kg (188 lb)   SpO2 98%   BMI 33.30 kg/m²   Vitals:    11/10/22 1310   BP: 124/76   BP Location: Left arm   Patient Position: Sitting   Pulse: 78   Temp: 97.1 °F (36.2 °C)   SpO2: 98%   Weight: 85.3 kg (188 lb)   Height: 160 cm (63\")      Lab " Results (most recent)     None        Physical Exam  Vitals reviewed.   Constitutional:       General: She is awake.      Appearance: Normal appearance. She is well-developed and well-groomed. She is obese.   HENT:      Head: Normocephalic.   Eyes:      General: Lids are normal.      Comments: Wears glasses   Neck:      Vascular: No carotid bruit, hepatojugular reflux or JVD.   Cardiovascular:      Rate and Rhythm: Normal rate and regular rhythm.      Pulses:           Radial pulses are 2+ on the right side and 2+ on the left side.        Dorsalis pedis pulses are 2+ on the right side and 2+ on the left side.        Posterior tibial pulses are 2+ on the right side and 2+ on the left side.      Heart sounds: Normal heart sounds.   Pulmonary:      Effort: Pulmonary effort is normal.      Breath sounds: Normal breath sounds and air entry.   Abdominal:      General: Bowel sounds are normal.      Palpations: Abdomen is soft.   Musculoskeletal:      Right lower leg: No edema.      Left lower leg: No edema.      Comments: Uses a cane    Skin:     General: Skin is warm and dry.      Findings: Bruising (LFA) present.   Neurological:      Mental Status: She is alert and oriented to person, place, and time.   Psychiatric:         Attention and Perception: Attention and perception normal.         Mood and Affect: Mood and affect normal.         Speech: Speech normal.         Behavior: Behavior normal. Behavior is cooperative.         Thought Content: Thought content normal.         Cognition and Memory: Cognition and memory normal.         Judgment: Judgment normal.         Procedure   Procedures         Assessment & Plan      Diagnosis Plan   1. Coronary artery disease involving native coronary artery of native heart without angina pectoris        2. Dilated cardiomyopathy (HCC)        3. Essential hypertension        4. Grade I diastolic dysfunction        5. Hyperlipidemia, unspecified hyperlipidemia type        6. Bilateral  carotid artery stenosis        7. Shortness of breath        8. Peripheral edema        9. Palpitations            Return in about 6 months (around 5/10/2023).    CAD-patient's on aspirin, beta and statin.  Dilated cardiomyopathy/peripheral edema/diastolic dysfunction-patient is on beta-blocker and Entresto and doing well.  She also has Lasix as needed.  Hypertension-patient is on beta-blocker and Entresto and doing well.  Hyperlipidemia-patient is on Lipitor.  Carotid artery disease-patient's on aspirin and statin.  Shortness of breath-stable.  Palpitations-stable on beta-blocker.  She will continue her medication regimen.  She will follow-up in 6 months or sooner if any changes.    We have requested labs from PCP.       Kate Macias  reports that she has quit smoking. Her smoking use included cigarettes. She has a 7.50 pack-year smoking history. She has never used smokeless tobacco.. Patient brought in medicine list to appointment, it's been reviewed with patient and med list was updated in the chart.      Advance Care Planning   ACP discussion was declined by the patient. Patient has an advance directive (not in EMR), copy requested.         Electronically signed by:

## 2023-01-04 RX ORDER — METOPROLOL SUCCINATE 50 MG/1
50 TABLET, EXTENDED RELEASE ORAL DAILY
Qty: 90 TABLET | Refills: 3 | Status: SHIPPED | OUTPATIENT
Start: 2023-01-04

## 2023-10-02 DIAGNOSIS — I51.89 GRADE I DIASTOLIC DYSFUNCTION: ICD-10-CM

## 2023-10-02 DIAGNOSIS — I42.0 DILATED CARDIOMYOPATHY: ICD-10-CM

## 2023-10-02 RX ORDER — METOPROLOL SUCCINATE 50 MG/1
50 TABLET, EXTENDED RELEASE ORAL DAILY
Qty: 90 TABLET | Refills: 3 | Status: SHIPPED | OUTPATIENT
Start: 2023-10-02

## 2023-10-02 NOTE — TELEPHONE ENCOUNTER
Patient called for refill of Entresto (30 day supply) and Metoprolol (90 day supply) to [facility] in [place]. Sent as requested.

## 2023-12-26 RX ORDER — METOPROLOL SUCCINATE 50 MG/1
50 TABLET, EXTENDED RELEASE ORAL DAILY
Qty: 90 TABLET | Refills: 0 | Status: SHIPPED | OUTPATIENT
Start: 2023-12-26

## 2024-02-07 ENCOUNTER — TELEPHONE (OUTPATIENT)
Dept: CARDIOLOGY | Facility: CLINIC | Age: 84
End: 2024-02-07
Payer: MEDICARE

## 2024-02-07 NOTE — TELEPHONE ENCOUNTER
"Relay     \"Calling to remind you of your appointment with Ender Holden on 02/09/2024. He is now located in the basement of the building. Please bring insurance cards, ID, and an updated medication list with you to your appointment. If you've had any blood work or been in the hospital or ER since your last visit, we need to know when and where so we can obtain those records. Thank you \"          "

## 2024-02-09 ENCOUNTER — OFFICE VISIT (OUTPATIENT)
Dept: CARDIOLOGY | Facility: CLINIC | Age: 84
End: 2024-02-09
Payer: MEDICARE

## 2024-02-09 VITALS
HEART RATE: 70 BPM | WEIGHT: 192 LBS | BODY MASS INDEX: 34.02 KG/M2 | DIASTOLIC BLOOD PRESSURE: 77 MMHG | OXYGEN SATURATION: 96 % | HEIGHT: 63 IN | SYSTOLIC BLOOD PRESSURE: 146 MMHG

## 2024-02-09 DIAGNOSIS — R06.02 SHORTNESS OF BREATH: ICD-10-CM

## 2024-02-09 DIAGNOSIS — I42.0 DILATED CARDIOMYOPATHY: ICD-10-CM

## 2024-02-09 DIAGNOSIS — I25.10 CORONARY ARTERY DISEASE INVOLVING NATIVE CORONARY ARTERY OF NATIVE HEART WITHOUT ANGINA PECTORIS: Primary | ICD-10-CM

## 2024-02-09 DIAGNOSIS — I51.89 GRADE I DIASTOLIC DYSFUNCTION: ICD-10-CM

## 2024-02-09 PROCEDURE — 3078F DIAST BP <80 MM HG: CPT | Performed by: PHYSICIAN ASSISTANT

## 2024-02-09 PROCEDURE — 1159F MED LIST DOCD IN RCRD: CPT | Performed by: PHYSICIAN ASSISTANT

## 2024-02-09 PROCEDURE — 1160F RVW MEDS BY RX/DR IN RCRD: CPT | Performed by: PHYSICIAN ASSISTANT

## 2024-02-09 PROCEDURE — 99214 OFFICE O/P EST MOD 30 MIN: CPT | Performed by: PHYSICIAN ASSISTANT

## 2024-02-09 PROCEDURE — 93000 ELECTROCARDIOGRAM COMPLETE: CPT | Performed by: PHYSICIAN ASSISTANT

## 2024-02-09 PROCEDURE — 3077F SYST BP >= 140 MM HG: CPT | Performed by: PHYSICIAN ASSISTANT

## 2024-02-09 RX ORDER — FUROSEMIDE 20 MG/1
20 TABLET ORAL DAILY
Qty: 30 TABLET | Refills: 3 | Status: SHIPPED | OUTPATIENT
Start: 2024-02-09

## 2024-02-09 NOTE — PROGRESS NOTES
Problem list     Subjective   Kate Macias is a 83 y.o. female     Chief Complaint   Patient presents with    Follow-up     6 month follow up denies chest pain, soa, palpitation's.    Problem list:     1.  CAD  1.1 left heart cath 4/2017 - LVEF 20 to 25%, 60% napkin ring stenosis of the left circumflex  2.  Cardiomyopathy  2.1 echo 3/24/17-mitral regurgitation, moderate to severe, EF 20 to 25%  2.2 echo 1/6/2021-EF 40 to 45% with 3D EF of 46%; mild LVH, diastolic dysfunction 1, eccentric jet of mitral regurgitation, physiological TR, posterior lateral pericardial effusion without tamponade  2.3 Echo 8/31/2022-EF 50 to 55%, diastolic dysfunction 1, mild MR, trivial to mild TR, trivial to small pericardial effusion, PA is in the low 30s  3.  Hypertension  4.  Hyperlipidemia  5.  Palpitations  6.  Shortness of breath  7.  Arthritis  8.  Glaucoma  9.  Early family history of coronary artery disease  10.  Carotid artery disease  10.1 carotid artery ultrasound 6/18/2019-16 to 49% stenosis mid and distal left internal carotid artery, mild bifurcation disease but more than the left with 16-49 stenosis bilaterally, antegrade flow both vertebral arteries       HPI  The patient presents in clinic today for routine evaluation and follow-up.  For the most part, the patient has done reasonably well since last evaluation.  She really denies chest pain.  She has stable dyspnea and fatigue, occasional limiting and potentially progressive since last evaluation.  She denies failure nor dysrhythmic symptoms.  We reviewed previous evaluation.  Her biggest concern in the past has been with systolic dysfunction.  Again we reviewed echocardiogram findings from August, 2022, which indicated now normalized systolic function.  We also reviewed previous catheterization findings which indicated at least moderate disease involving the circumflex as above.  This has been managed medically since.  She has no failure nor dysrhythmic symptoms.   She has no further complaints.    Current Outpatient Medications on File Prior to Visit   Medication Sig Dispense Refill    aspirin 81 MG EC tablet Take 1 tablet by mouth Daily.      atorvastatin (LIPITOR) 40 MG tablet Take 1 tablet by mouth Daily.      B Complex Vitamins (VITAMIN B COMPLEX) capsule capsule Take  by mouth Daily.      Fexofenadine HCl (ALLEGRA ALLERGY PO) Take  by mouth.      latanoprost (XALATAN) 0.005 % ophthalmic solution Take As Directed. 1 drop in each eye, per day      MELATONIN PO Take 10 mg by mouth Every Night.      metoprolol succinate XL (TOPROL-XL) 50 MG 24 hr tablet Take 1 tablet by mouth Daily. 90 tablet 0    Multiple Vitamins-Minerals (MULTIVITAMIN WOMEN 50+ PO) Take  by mouth.      potassium chloride (K-DUR) 10 MEQ CR tablet Take 1 tablet by mouth Daily.  1     No current facility-administered medications on file prior to visit.       Lisinopril    Past Medical History:   Diagnosis Date    Abnormal ECG     Arthritis     CHF (congestive heart failure)     COVID-19 vaccine administered 2021    1st shot / 2nd vaccine 2021 [employer]. 2021    DDD (degenerative disc disease), cervical     DDD (degenerative disc disease), lumbar     DDD (degenerative disc disease), thoracic     Ejection fraction < 50%     Enlarged heart     Glaucoma     Hyperlipidemia     Hypertension     Left ventricular hypertrophy due to hypertensive disease     Myocardial infarction 2017 Heart Failure    Neck pain     reverse curvature of spine in neck        Social History     Socioeconomic History    Marital status:    Tobacco Use    Smoking status: Former     Packs/day: 0.50     Years: 15.00     Additional pack years: 0.00     Total pack years: 7.50     Types: Cigarettes     Start date: 1955     Quit date: 1968     Years since quittin.1    Smokeless tobacco: Never   Vaping Use    Vaping Use: Never used   Substance and Sexual Activity    Alcohol use: Yes     Alcohol/week: 5.0  "standard drinks of alcohol     Types: 5 Glasses of wine per week    Drug use: Never    Sexual activity: Not Currently     Partners: Male     Birth control/protection: Diaphragm, Tubal ligation, Birth control pill, Surgical       Family History   Problem Relation Age of Onset    COPD Mother     Macular degeneration Mother     Blindness Mother     Anemia Mother     No Known Problems Father     Heart attack Brother        Review of Systems   Constitutional: Negative.  Negative for chills, diaphoresis, fatigue and fever.   HENT:  Positive for sinus pressure.    Eyes: Negative.  Negative for visual disturbance (wears glasses).   Respiratory: Negative.  Negative for apnea, cough, chest tightness, shortness of breath and wheezing.    Cardiovascular: Negative.  Negative for chest pain, palpitations and leg swelling.   Gastrointestinal: Negative.  Negative for abdominal pain and blood in stool.   Endocrine: Negative.    Genitourinary: Negative.  Negative for hematuria.   Musculoskeletal:  Positive for arthralgias, back pain, myalgias, neck pain and neck stiffness.   Skin: Negative.  Negative for rash and wound.   Allergic/Immunologic: Positive for environmental allergies (seasonal). Negative for food allergies.   Neurological:  Positive for light-headedness (patient reports due to sinus congestion). Negative for dizziness, syncope, weakness, numbness and headaches.   Hematological:  Bruises/bleeds easily (bruises easily).   Psychiatric/Behavioral: Negative.  Negative for sleep disturbance.        Objective   Vitals:    02/09/24 0913   BP: 146/77   Pulse: 70   SpO2: 96%   Weight: 87.1 kg (192 lb)   Height: 160 cm (63\")      /77   Pulse 70   Ht 160 cm (63\")   Wt 87.1 kg (192 lb)   SpO2 96%   BMI 34.01 kg/m²    Lab Results (most recent)       None          Physical Exam  Vitals and nursing note reviewed.   Constitutional:       General: She is not in acute distress.     Appearance: She is well-developed.   HENT:      " Head: Normocephalic and atraumatic.   Eyes:      Conjunctiva/sclera: Conjunctivae normal.      Pupils: Pupils are equal, round, and reactive to light.   Neck:      Vascular: No JVD.      Trachea: No tracheal deviation.   Cardiovascular:      Rate and Rhythm: Normal rate and regular rhythm.      Heart sounds: Normal heart sounds.   Pulmonary:      Effort: Pulmonary effort is normal.      Breath sounds: Normal breath sounds.   Abdominal:      General: Bowel sounds are normal. There is no distension.      Palpations: Abdomen is soft. There is no mass.      Tenderness: There is no abdominal tenderness. There is no guarding or rebound.   Musculoskeletal:         General: No tenderness or deformity. Normal range of motion.      Cervical back: Normal range of motion and neck supple.   Skin:     General: Skin is warm and dry.      Coloration: Skin is not pale.      Findings: No erythema or rash.   Neurological:      Mental Status: She is alert and oriented to person, place, and time.   Psychiatric:         Behavior: Behavior normal.         Thought Content: Thought content normal.         Judgment: Judgment normal.           Procedure     ECG 12 Lead    Date/Time: 2/9/2024 9:19 AM  Performed by: Ender Holden PA    Authorized by: Ender Holden PA  Comparison: compared with previous ECG from 7/28/2022             Assessment & Plan      Diagnosis Plan   1. Coronary artery disease involving native coronary artery of native heart without angina pectoris  Stress Test With Myocardial Perfusion One Day      2. Dilated cardiomyopathy  sacubitril-valsartan (ENTRESTO) 24-26 MG tablet    Stress Test With Myocardial Perfusion One Day      3. Grade I diastolic dysfunction  sacubitril-valsartan (ENTRESTO) 24-26 MG tablet    Stress Test With Myocardial Perfusion One Day      4. Shortness of breath  Stress Test With Myocardial Perfusion One Day        1.  The patient presents in the clinic today for routine evaluation and  follow-up.  We have reviewed all previous test findings.  Again echo indicated normal systolic function, performed in August, 2022.  Systolic function had improved significantly when compared to prior studies.  We have reviewed this in detail with her.  She seems very satisfied with test findings.    2.  She does have moderate disease involving the circumflex.  Although she denies chest pain of any significance, I would like to repeat ischemia assessment to ensure no ischemia in the circumflex distribution.  Previous cath supported at least 60% stenosis involving the same.  She has not had ischemia assessment in some time.  We will await results of stress test findings to ensure stability and circumflex.    3.  We did give her refills of requested medications.  That has been sent to her pharmacy.  We will make no adjustments in medications as she has responded well to this regimen.    4.  We will continue to see the patient routinely through the clinic.  If stress test is abnormal, we will see her immediately.  If unremarkable, nothing further would be indicated and we will see her on 6-month intervals.       Patient did not bring med list or medicine bottles to appointment, med list has been reviewed and updated based on patient's knowledge of their meds.      Advance Care Planning   ACP discussion was held with the patient during this visit. Patient has an advance directive in EMR which is still valid.          Electronically signed by:

## 2024-02-22 ENCOUNTER — HOSPITAL ENCOUNTER (OUTPATIENT)
Dept: CARDIOLOGY | Facility: HOSPITAL | Age: 84
Discharge: HOME | End: 2024-02-22
Payer: MEDICARE

## 2024-02-22 DIAGNOSIS — I25.10 CORONARY ARTERY DISEASE INVOLVING NATIVE CORONARY ARTERY OF NATIVE HEART WITHOUT ANGINA PECTORIS: ICD-10-CM

## 2024-02-22 DIAGNOSIS — R06.02 SHORTNESS OF BREATH: ICD-10-CM

## 2024-02-22 DIAGNOSIS — I51.89 GRADE I DIASTOLIC DYSFUNCTION: ICD-10-CM

## 2024-02-22 DIAGNOSIS — I42.0 DILATED CARDIOMYOPATHY: ICD-10-CM

## 2024-02-22 LAB
BH CV REST NUCLEAR ISOTOPE DOSE: 10 MCI
BH CV STRESS COMMENTS STAGE 1: NORMAL
BH CV STRESS DOSE REGADENOSON STAGE 1: 0.4
BH CV STRESS DURATION MIN STAGE 1: 0
BH CV STRESS DURATION SEC STAGE 1: 10
BH CV STRESS NUCLEAR ISOTOPE DOSE: 30 MCI
BH CV STRESS PROTOCOL 1: NORMAL
BH CV STRESS RECOVERY BP: NORMAL MMHG
BH CV STRESS RECOVERY HR: 69 BPM
BH CV STRESS STAGE 1: 1
MAXIMAL PREDICTED HEART RATE: 137 BPM
PERCENT MAX PREDICTED HR: 58.39 %
STRESS BASELINE BP: NORMAL MMHG
STRESS BASELINE HR: 63 BPM
STRESS PERCENT HR: 69 %
STRESS POST PEAK BP: NORMAL MMHG
STRESS POST PEAK HR: 80 BPM
STRESS TARGET HR: 116 BPM

## 2024-02-22 PROCEDURE — 0 TECHNETIUM SESTAMIBI: Performed by: INTERNAL MEDICINE

## 2024-02-22 PROCEDURE — A9500 TC99M SESTAMIBI: HCPCS | Performed by: INTERNAL MEDICINE

## 2024-02-22 PROCEDURE — 93017 CV STRESS TEST TRACING ONLY: CPT

## 2024-02-22 PROCEDURE — 78452 HT MUSCLE IMAGE SPECT MULT: CPT

## 2024-02-22 PROCEDURE — 25010000002 REGADENOSON 0.4 MG/5ML SOLUTION: Performed by: INTERNAL MEDICINE

## 2024-02-22 RX ORDER — REGADENOSON 0.08 MG/ML
0.4 INJECTION, SOLUTION INTRAVENOUS
Status: COMPLETED | OUTPATIENT
Start: 2024-02-22 | End: 2024-02-22

## 2024-02-22 RX ADMIN — REGADENOSON 0.4 MG: 0.08 INJECTION, SOLUTION INTRAVENOUS at 11:33

## 2024-02-22 RX ADMIN — TECHNETIUM TC 99M SESTAMIBI 1 DOSE: 1 INJECTION INTRAVENOUS at 11:33

## 2024-02-22 RX ADMIN — TECHNETIUM TC 99M SESTAMIBI 1 DOSE: 1 INJECTION INTRAVENOUS at 10:17

## 2024-02-26 ENCOUNTER — TELEPHONE (OUTPATIENT)
Dept: CARDIOLOGY | Facility: CLINIC | Age: 84
End: 2024-02-26
Payer: MEDICARE

## 2024-02-26 NOTE — TELEPHONE ENCOUNTER
Called patient with result's and recommendation's, patient scheduled with Ender on Wednesday at 11:00 am patient instructed to bring her medication's with her to appointment.

## 2024-02-26 NOTE — TELEPHONE ENCOUNTER
----- Message from ARACELI Garcia sent at 2/25/2024  9:31 PM EST -----  Follow-up 2 to 3 weeks.  ----- Message -----  From: Harry Cifuentes MD  Sent: 2/22/2024   8:43 PM EST  To: ARACELI Garcia    Stress Test With Myocardial Perfusion One Day  Order: [id number]  Status: Final result       Visible to patient: Yes (seen)       Dx: Shortness of breath; Dilated cardiomy...    0 Result Notes  Details    Reading Physician Reading Date Result Priority   Harry Cifuentes MD  [phone] 2/22/2024 Routine   Harry Cifuentes MD  [phone] 2/22/2024      Result Text  1.  Scintigraphy demonstrates a large, mildly dense but nearly completely reversible defect involving the anterior wall, portion of the anteroseptal wall, and the more apical portions of the anterolateral wall.     2.  Moderately depressed post stress ejection fraction of 43% with septal akinesis.     3.  Normal transient ischemic dilation and lung heart radiopharmaceutical ratios of 0.98 and 0.24 argue against multivessel coronary disease and increased LV filling pressures respectively.

## 2024-02-28 ENCOUNTER — OFFICE VISIT (OUTPATIENT)
Dept: CARDIOLOGY | Facility: CLINIC | Age: 84
End: 2024-02-28
Payer: MEDICARE

## 2024-02-28 VITALS
SYSTOLIC BLOOD PRESSURE: 145 MMHG | BODY MASS INDEX: 34.05 KG/M2 | WEIGHT: 192.2 LBS | HEIGHT: 63 IN | HEART RATE: 70 BPM | DIASTOLIC BLOOD PRESSURE: 80 MMHG

## 2024-02-28 DIAGNOSIS — I10 ESSENTIAL HYPERTENSION: ICD-10-CM

## 2024-02-28 DIAGNOSIS — R06.02 SHORTNESS OF BREATH: ICD-10-CM

## 2024-02-28 DIAGNOSIS — I25.10 CORONARY ARTERY DISEASE INVOLVING NATIVE CORONARY ARTERY OF NATIVE HEART WITHOUT ANGINA PECTORIS: Primary | ICD-10-CM

## 2024-02-28 PROCEDURE — 3079F DIAST BP 80-89 MM HG: CPT | Performed by: PHYSICIAN ASSISTANT

## 2024-02-28 PROCEDURE — 99214 OFFICE O/P EST MOD 30 MIN: CPT | Performed by: PHYSICIAN ASSISTANT

## 2024-02-28 PROCEDURE — 1159F MED LIST DOCD IN RCRD: CPT | Performed by: PHYSICIAN ASSISTANT

## 2024-02-28 PROCEDURE — 3077F SYST BP >= 140 MM HG: CPT | Performed by: PHYSICIAN ASSISTANT

## 2024-02-28 PROCEDURE — 1160F RVW MEDS BY RX/DR IN RCRD: CPT | Performed by: PHYSICIAN ASSISTANT

## 2024-02-28 RX ORDER — ACETAMINOPHEN 500 MG
500 TABLET ORAL EVERY 6 HOURS PRN
COMMUNITY
Start: 2023-10-25

## 2024-02-28 RX ORDER — TRAMADOL HYDROCHLORIDE 50 MG/1
1 TABLET ORAL 2 TIMES DAILY
COMMUNITY

## 2024-02-28 RX ORDER — NITROGLYCERIN 0.4 MG/1
TABLET SUBLINGUAL
Qty: 25 TABLET | Refills: 2 | Status: SHIPPED | OUTPATIENT
Start: 2024-02-28

## 2024-02-28 NOTE — PROGRESS NOTES
Problem list     Subjective   Kate Macias is a 83 y.o. female     Chief Complaint   Patient presents with    Follow-up     Abnormal stress test     Shortness of Breath   Problem list:     1.  CAD  1.1 left heart cath 4/2017 - LVEF 20 to 25%, 60% napkin ring stenosis of the left circumflex  2.  Cardiomyopathy  2.1 echo 3/24/17-mitral regurgitation, moderate to severe, EF 20 to 25%  2.2 echo 1/6/2021-EF 40 to 45% with 3D EF of 46%; mild LVH, diastolic dysfunction 1, eccentric jet of mitral regurgitation, physiological TR, posterior lateral pericardial effusion without tamponade  2.3 Echo 8/31/2022-EF 50 to 55%, diastolic dysfunction 1, mild MR, trivial to mild TR, trivial to small pericardial effusion, PA is in the low 30s  3.  Hypertension  4.  Hyperlipidemia  5.  Palpitations  6.  Shortness of breath  7.  Arthritis  8.  Glaucoma  9.  Early family history of coronary artery disease  10.  Carotid artery disease  10.1 carotid artery ultrasound 6/18/2019-16 to 49% stenosis mid and distal left internal carotid artery, mild bifurcation disease but more than the left with 16-49 stenosis bilaterally, antegrade flow both vertebral arteries    HPI  The patient presents back into the clinic today to review stress test findings primarily.  She was seen at last evaluation scheduled for repeat noninvasive evaluation given known circumflex disease and coronary artery disease history otherwise as above.  Stress test was performed and suggested possible anterior wall ischemia, anteroseptal ischemia, and distal anterolateral ischemia.  Her concern was potential ischemia in the circumflex distribution given moderate disease noted previously.  We have managed that medically up until recently.  Clinically, the patient appears to be doing fairly well.  She reports only rare chest pain.  Dyspnea remains at baseline.  She has no failure nor dysrhythmic symptoms noted.  The patient has no further complaints.    Current Outpatient  Medications on File Prior to Visit   Medication Sig Dispense Refill    acetaminophen (TYLENOL) 500 MG tablet Take 1 tablet by mouth Every 6 (Six) Hours As Needed.      aspirin 81 MG EC tablet Take 1 tablet by mouth Daily.      atorvastatin (LIPITOR) 40 MG tablet Take 1 tablet by mouth Daily.      B Complex Vitamins (VITAMIN B COMPLEX) capsule capsule Take  by mouth Daily.      Fexofenadine HCl (ALLEGRA ALLERGY PO) Take  by mouth.      furosemide (LASIX) 20 MG tablet Take 1 tablet by mouth Daily. 30 tablet 3    latanoprost (XALATAN) 0.005 % ophthalmic solution Take As Directed. 1 drop in each eye, per day      MELATONIN PO Take 10 mg by mouth Every Night.      metoprolol succinate XL (TOPROL-XL) 50 MG 24 hr tablet Take 1 tablet by mouth Daily. 90 tablet 0    Multiple Vitamins-Minerals (MULTIVITAMIN WOMEN 50+ PO) Take  by mouth.      potassium chloride (K-DUR) 10 MEQ CR tablet Take 1 tablet by mouth Daily.  1    sacubitril-valsartan (ENTRESTO) 24-26 MG tablet Take 1 tablet by mouth 2 (Two) Times a Day. (start after being off Lisinopril for 3 days) 60 tablet 11    traMADol (ULTRAM) 50 MG tablet Take 1 tablet by mouth 2 (Two) Times a Day.       No current facility-administered medications on file prior to visit.       Lisinopril    Past Medical History:   Diagnosis Date    Abnormal ECG     Arthritis     CHF (congestive heart failure)     COVID-19 vaccine administered 02/20/2021    1st shot / 2nd vaccine 03/24/2021 Walter Desouza. 11/30/2021    DDD (degenerative disc disease), cervical     DDD (degenerative disc disease), lumbar     DDD (degenerative disc disease), thoracic     Ejection fraction < 50%     Enlarged heart     Glaucoma     Hyperlipidemia     Hypertension     Left ventricular hypertrophy due to hypertensive disease     Myocardial infarction 2017 Heart Failure    Neck pain     reverse curvature of spine in neck        Social History     Socioeconomic History    Marital status:    Tobacco Use    Smoking  "status: Former     Packs/day: 0.50     Years: 15.00     Additional pack years: 0.00     Total pack years: 7.50     Types: Cigarettes     Start date: 1955     Quit date: 1968     Years since quittin.1    Smokeless tobacco: Never   Vaping Use    Vaping Use: Never used   Substance and Sexual Activity    Alcohol use: Not Currently     Alcohol/week: 5.0 standard drinks of alcohol    Drug use: Never    Sexual activity: Not Currently     Partners: Male     Birth control/protection: Diaphragm, Tubal ligation, Birth control pill, Hysterectomy, Surgical       Family History   Problem Relation Age of Onset    COPD Mother     Macular degeneration Mother     Blindness Mother     Anemia Mother     No Known Problems Father     Heart attack Brother        Review of Systems   Constitutional: Negative.  Negative for chills, diaphoresis, fatigue and fever.   HENT: Negative.     Eyes: Negative.  Negative for visual disturbance.   Respiratory:  Positive for shortness of breath. Negative for apnea, cough, chest tightness and wheezing.    Cardiovascular: Negative.  Negative for chest pain, palpitations and leg swelling.   Gastrointestinal: Negative.  Negative for abdominal pain and blood in stool.   Endocrine: Negative.    Genitourinary: Negative.  Negative for hematuria.   Musculoskeletal:  Positive for arthralgias, back pain, myalgias, neck pain and neck stiffness.   Skin: Negative.  Negative for rash and wound.   Allergic/Immunologic: Positive for environmental allergies (seasonal). Negative for food allergies.   Neurological: Negative.  Negative for dizziness, syncope, weakness, light-headedness, numbness and headaches.   Hematological:  Bruises/bleeds easily (bruises easily).   Psychiatric/Behavioral:  Positive for sleep disturbance.        Objective   Vitals:    24 1134   BP: 145/80   Pulse: 70   Weight: 87.2 kg (192 lb 3.2 oz)   Height: 160 cm (63\")      /80   Pulse 70   Ht 160 cm (63\")   Wt 87.2 kg (192 " lb 3.2 oz)   BMI 34.05 kg/m²    Lab Results (most recent)       None          Physical Exam  Vitals and nursing note reviewed.   Constitutional:       General: She is not in acute distress.     Appearance: She is well-developed.   HENT:      Head: Normocephalic and atraumatic.   Eyes:      Conjunctiva/sclera: Conjunctivae normal.      Pupils: Pupils are equal, round, and reactive to light.   Neck:      Vascular: No JVD.      Trachea: No tracheal deviation.   Cardiovascular:      Rate and Rhythm: Normal rate and regular rhythm.      Heart sounds: Normal heart sounds.   Pulmonary:      Effort: Pulmonary effort is normal.      Breath sounds: Normal breath sounds.   Abdominal:      General: Bowel sounds are normal. There is no distension.      Palpations: Abdomen is soft. There is no mass.      Tenderness: There is no abdominal tenderness. There is no guarding or rebound.   Musculoskeletal:         General: No tenderness or deformity. Normal range of motion.      Cervical back: Normal range of motion and neck supple.   Skin:     General: Skin is warm and dry.      Coloration: Skin is not pale.      Findings: No erythema or rash.   Neurological:      Mental Status: She is alert and oriented to person, place, and time.   Psychiatric:         Behavior: Behavior normal.         Thought Content: Thought content normal.         Judgment: Judgment normal.           Procedure   Procedures       Assessment & Plan      Diagnosis Plan   1. Coronary artery disease involving native coronary artery of native heart without angina pectoris        2. Shortness of breath        3. Essential hypertension        1.  The patient presents by stress test findings.  We had recommended a stress test to evaluate known disease in the circumflex.  She had at least moderate disease by previous cath, by report, as above.  Stress was performed.  This suggested mostly anterior wall ischemia.  She had 1 small segment however in the circumflex  distribution, with distal anterior lateral ischemia reported.  I have requested previous cath films so that we can review the same.  Once we can review, we can recommend her further on revascularization.   if we are unable to obtain that, as she had that in [place], we will discuss options further at that time.  Options certainly would include repeat cath with planned intervention of the circumflex if indicated by Findings.    2.  For now, the patient appears to be on appropriate medications which we will not adjust.    3.  I would like to see the patient back as soon as we can obtain cath films.  I have asked nursing staff to request cath films today.  Once received, I will have the reviewed by Dr. Cifuentes and we will contact her immediately.             Advance Care Planning   ACP discussion was held with the patient during this visit. Patient has an advance directive in EMR which is still valid.          Electronically signed by:

## 2024-03-29 ENCOUNTER — CLINICAL SUPPORT (OUTPATIENT)
Dept: CARDIOLOGY | Facility: CLINIC | Age: 84
End: 2024-03-29
Payer: MEDICARE

## 2024-03-29 VITALS — DIASTOLIC BLOOD PRESSURE: 82 MMHG | OXYGEN SATURATION: 97 % | HEART RATE: 65 BPM | SYSTOLIC BLOOD PRESSURE: 158 MMHG

## 2024-03-29 DIAGNOSIS — I25.10 CORONARY ARTERY DISEASE INVOLVING NATIVE CORONARY ARTERY OF NATIVE HEART WITHOUT ANGINA PECTORIS: Primary | ICD-10-CM

## 2024-03-29 NOTE — PROGRESS NOTES
Kate Macias  1940  3/29/2024   ?   Chief Complaint   Patient presents with    Coronary Artery Disease     Symptom check      ?   HPI:   ?   ? Patient presents as one month symptom check following abnormal stress test. At time of visit, provider was awaiting previous cath results from [place]. Patient reports she is doing well and denies chest pain, pressure or more shortness of breath than normal.   ?     Current Outpatient Medications:     aspirin 81 MG EC tablet, Take 1 tablet by mouth Daily., Disp: , Rfl:     atorvastatin (LIPITOR) 40 MG tablet, Take 1 tablet by mouth Daily., Disp: , Rfl:     B Complex Vitamins (VITAMIN B COMPLEX) capsule capsule, Take  by mouth Daily., Disp: , Rfl:     Fexofenadine HCl (ALLEGRA ALLERGY PO), Take  by mouth., Disp: , Rfl:     furosemide (LASIX) 20 MG tablet, Take 1 tablet by mouth Daily., Disp: 30 tablet, Rfl: 3    latanoprost (XALATAN) 0.005 % ophthalmic solution, Take As Directed. 1 drop in each eye, per day, Disp: , Rfl:     MELATONIN PO, Take 10 mg by mouth Every Night., Disp: , Rfl:     metoprolol succinate XL (TOPROL-XL) 50 MG 24 hr tablet, Take 1 tablet by mouth Daily., Disp: 90 tablet, Rfl: 0    Multiple Vitamins-Minerals (MULTIVITAMIN WOMEN 50+ PO), Take  by mouth., Disp: , Rfl:     nitroglycerin (NITROSTAT) 0.4 MG SL tablet, 1 under the tongue as needed for angina, may repeat q5mins for up three doses, Disp: 25 tablet, Rfl: 2    sacubitril-valsartan (ENTRESTO) 24-26 MG tablet, Take 1 tablet by mouth 2 (Two) Times a Day. (start after being off Lisinopril for 3 days), Disp: 60 tablet, Rfl: 11    traMADol (ULTRAM) 50 MG tablet, Take 1 tablet by mouth 2 (Two) Times a Day., Disp: , Rfl:    ?   ?   Lisinopril       Procedures     ?   Assessment & Plan    ?   ?   ?     1. CAD    Symptoms and chart reviewed by Ender Holden PA-C with recommendations for 3 month follow up. Patient will call with any worsening symptoms or concerns. Advised she will be called with  appointment date and time.

## 2024-05-28 RX ORDER — FUROSEMIDE 20 MG/1
20 TABLET ORAL DAILY
Qty: 30 TABLET | Refills: 0 | Status: SHIPPED | OUTPATIENT
Start: 2024-05-28

## 2024-06-17 ENCOUNTER — OFFICE VISIT (OUTPATIENT)
Dept: CARDIOLOGY | Facility: CLINIC | Age: 84
End: 2024-06-17
Payer: MEDICARE

## 2024-06-17 VITALS
BODY MASS INDEX: 34.55 KG/M2 | DIASTOLIC BLOOD PRESSURE: 73 MMHG | OXYGEN SATURATION: 99 % | HEIGHT: 63 IN | WEIGHT: 195 LBS | HEART RATE: 62 BPM | SYSTOLIC BLOOD PRESSURE: 134 MMHG

## 2024-06-17 DIAGNOSIS — I10 ESSENTIAL HYPERTENSION: ICD-10-CM

## 2024-06-17 DIAGNOSIS — I25.10 CORONARY ARTERY DISEASE INVOLVING NATIVE CORONARY ARTERY OF NATIVE HEART WITHOUT ANGINA PECTORIS: Primary | ICD-10-CM

## 2024-06-17 DIAGNOSIS — R94.39 ABNORMAL NUCLEAR STRESS TEST: ICD-10-CM

## 2024-06-17 DIAGNOSIS — R06.02 SHORTNESS OF BREATH: ICD-10-CM

## 2024-06-17 PROCEDURE — 1160F RVW MEDS BY RX/DR IN RCRD: CPT | Performed by: PHYSICIAN ASSISTANT

## 2024-06-17 PROCEDURE — 99214 OFFICE O/P EST MOD 30 MIN: CPT | Performed by: PHYSICIAN ASSISTANT

## 2024-06-17 PROCEDURE — 3078F DIAST BP <80 MM HG: CPT | Performed by: PHYSICIAN ASSISTANT

## 2024-06-17 PROCEDURE — 1159F MED LIST DOCD IN RCRD: CPT | Performed by: PHYSICIAN ASSISTANT

## 2024-06-17 PROCEDURE — 3075F SYST BP GE 130 - 139MM HG: CPT | Performed by: PHYSICIAN ASSISTANT

## 2024-06-17 RX ORDER — POTASSIUM CHLORIDE 750 MG/1
1 TABLET, FILM COATED, EXTENDED RELEASE ORAL DAILY
COMMUNITY
Start: 2024-06-12

## 2024-06-17 NOTE — PROGRESS NOTES
Problem list     Subjective   Kate Macias is a 84 y.o. female     Chief Complaint   Patient presents with    Follow-up     3 month follow up - patient brought lab result's with her to visit.    Problem list:     1.  CAD  1.1 left heart cath 4/2017 - LVEF 20 to 25%, 60% napkin ring stenosis of the left circumflex  2.  Cardiomyopathy  2.1 echo 3/24/17-mitral regurgitation, moderate to severe, EF 20 to 25%  2.2 echo 1/6/2021-EF 40 to 45% with 3D EF of 46%; mild LVH, diastolic dysfunction 1, eccentric jet of mitral regurgitation, physiological TR, posterior lateral pericardial effusion without tamponade  2.3 Echo 8/31/2022-EF 50 to 55%, diastolic dysfunction 1, mild MR, trivial to mild TR, trivial to small pericardial effusion, PA is in the low 30s  3.  Hypertension  4.  Hyperlipidemia  5.  Palpitations  6.  Shortness of breath  7.  Arthritis  8.  Glaucoma  9.  Early family history of coronary artery disease  10.  Carotid artery disease  10.1 carotid artery ultrasound 6/18/2019-16 to 49% stenosis mid and distal left internal carotid artery, mild bifurcation disease but more than the left with 16-49 stenosis bilaterally, antegrade flow both vertebral arteries       HPI  The patient presents into the clinic today to discuss clinical course.  We have seen her last evaluation primarily to review stress test findings.  Stress test suggested anterior and anteroseptal wall ischemia.  There was questionable segments of ischemia involving the anterolateral wall, which was our concern given known circumflex disease noted by previous cath as above.  Post stress EF was at 43%.  She did have an echocardiogram, September, 2022, which again indicated now normalized systolic function with no significant valvular nor structural abnormalities.  We had hoped to recover and obtain cath films from [place] which would have been done in 2017.  We were unable to do that.  We wanted to review those and discuss further options with the  patient today.  As far, the patient is done fairly well since last evaluation.  She has had only 2 episodes where she might of had some degree of chest tightness.  She did not go to the emergency room, although EMS eventually was dispatched for 1 episode.  Symptoms resolved and she did not go to the ER.  Baseline dyspnea has remained somewhat limiting, but she again feels that this could be related to deconditioning.  She has no failure nor dysrhythmic symptoms.  She has no further complaints.    Current Outpatient Medications on File Prior to Visit   Medication Sig Dispense Refill    aspirin 81 MG EC tablet Take 1 tablet by mouth Daily.      atorvastatin (LIPITOR) 40 MG tablet Take 1 tablet by mouth Daily.      B Complex Vitamins (VITAMIN B COMPLEX) capsule capsule Take  by mouth Daily.      Fexofenadine HCl (ALLEGRA ALLERGY PO) Take  by mouth.      furosemide (LASIX) 20 MG tablet Take 1 tablet by mouth once daily 30 tablet 0    latanoprost (XALATAN) 0.005 % ophthalmic solution Take As Directed. 1 drop in each eye, per day      MELATONIN PO Take 10 mg by mouth Every Night.      metoprolol succinate XL (TOPROL-XL) 50 MG 24 hr tablet Take 1 tablet by mouth Daily. 90 tablet 0    Multiple Vitamins-Minerals (MULTIVITAMIN WOMEN 50+ PO) Take  by mouth.      nitroglycerin (NITROSTAT) 0.4 MG SL tablet 1 under the tongue as needed for angina, may repeat q5mins for up three doses 25 tablet 2    potassium chloride 10 MEQ CR tablet Take 1 tablet by mouth Daily.      sacubitril-valsartan (ENTRESTO) 24-26 MG tablet Take 1 tablet by mouth 2 (Two) Times a Day. (start after being off Lisinopril for 3 days) 60 tablet 11    traMADol (ULTRAM) 50 MG tablet Take 1 tablet by mouth 2 (Two) Times a Day.       No current facility-administered medications on file prior to visit.       Lisinopril    Past Medical History:   Diagnosis Date    Abnormal ECG     Arthritis     CHF (congestive heart failure)     COVID-19 vaccine administered  2021    1st shot / 2nd vaccine 2021 Walter Desouza. 2021    DDD (degenerative disc disease), cervical     DDD (degenerative disc disease), lumbar     DDD (degenerative disc disease), thoracic     Ejection fraction < 50%     Enlarged heart     Glaucoma     Hyperlipidemia     Hypertension     Left ventricular hypertrophy due to hypertensive disease     Myocardial infarction 2017 Heart Failure    Neck pain     reverse curvature of spine in neck        Social History     Socioeconomic History    Marital status:    Tobacco Use    Smoking status: Former     Current packs/day: 0.00     Average packs/day: 0.5 packs/day for 15.0 years (7.5 ttl pk-yrs)     Types: Cigarettes     Start date: 1955     Quit date: 1968     Years since quittin.4    Smokeless tobacco: Never   Vaping Use    Vaping status: Never Used   Substance and Sexual Activity    Alcohol use: Not Currently     Alcohol/week: 5.0 standard drinks of alcohol     Comment: Alcohol is not allowed    Drug use: Never    Sexual activity: Not Currently     Partners: Male     Birth control/protection: Diaphragm, Tubal ligation, Birth control pill, Hysterectomy, Surgical       Family History   Problem Relation Age of Onset    COPD Mother     Macular degeneration Mother     Blindness Mother     Anemia Mother     No Known Problems Father     Heart attack Brother        Review of Systems   Constitutional: Negative.  Negative for chills, diaphoresis, fatigue and fever.   HENT: Negative.     Eyes: Negative.  Negative for visual disturbance.   Respiratory:  Positive for cough. Negative for apnea, chest tightness, shortness of breath and wheezing.    Cardiovascular: Negative.  Negative for chest pain, palpitations and leg swelling.   Gastrointestinal: Negative.  Negative for abdominal pain and blood in stool.   Endocrine: Negative.    Genitourinary: Negative.  Negative for hematuria.   Musculoskeletal:  Positive for arthralgias, back pain,  "myalgias, neck pain and neck stiffness.   Skin: Negative.  Negative for rash and wound.   Allergic/Immunologic: Negative.  Negative for environmental allergies and food allergies.   Neurological: Negative.  Negative for dizziness, syncope, weakness, light-headedness, numbness and headaches.   Hematological:  Bruises/bleeds easily (bruises easily).   Psychiatric/Behavioral: Negative.  Negative for sleep disturbance.        Objective   Vitals:    06/17/24 1258   BP: 134/73   Pulse: 62   SpO2: 99%   Weight: 88.5 kg (195 lb)   Height: 160 cm (63\")      /73   Pulse 62   Ht 160 cm (63\")   Wt 88.5 kg (195 lb)   SpO2 99%   BMI 34.54 kg/m²    Lab Results (most recent)       None          Physical Exam  Vitals and nursing note reviewed.   Constitutional:       General: She is not in acute distress.     Appearance: She is well-developed.   HENT:      Head: Normocephalic and atraumatic.   Eyes:      Conjunctiva/sclera: Conjunctivae normal.      Pupils: Pupils are equal, round, and reactive to light.   Neck:      Vascular: No JVD.      Trachea: No tracheal deviation.   Cardiovascular:      Rate and Rhythm: Normal rate and regular rhythm.      Heart sounds: Normal heart sounds.   Pulmonary:      Effort: Pulmonary effort is normal.      Breath sounds: Normal breath sounds.   Abdominal:      General: Bowel sounds are normal. There is no distension.      Palpations: Abdomen is soft. There is no mass.      Tenderness: There is no abdominal tenderness. There is no guarding or rebound.   Musculoskeletal:         General: No tenderness or deformity. Normal range of motion.      Cervical back: Normal range of motion and neck supple.   Skin:     General: Skin is warm and dry.      Coloration: Skin is not pale.      Findings: No erythema or rash.   Neurological:      Mental Status: She is alert and oriented to person, place, and time.   Psychiatric:         Behavior: Behavior normal.         Thought Content: Thought content " normal.         Judgment: Judgment normal.           Procedure   Procedures       Assessment & Plan      Diagnosis Plan   1. Coronary artery disease involving native coronary artery of native heart without angina pectoris  [facility] Cath    CBC & Differential    Basic Metabolic Panel      2. Shortness of breath  [facility] Cath    CBC & Differential    Basic Metabolic Panel      3. Essential hypertension  [facility] Cath    CBC & Differential    Basic Metabolic Panel      4. Abnormal nuclear stress test        1.  The patient presents in to discuss clinical scenario and clinical course otherwise.  Her prior cath, some number of years ago in [place], indicated at least moderate disease involving the circumflex.  Recent stress test because of very symptoms, suggested anterior and some degree of anterolateral wall ischemia.  I would be concerned with her anterolateral wall ischemia given known circumflex disease.  We had hoped to obtain cath films.  We cannot obtain those.  With abnormal stress test and ongoing, albeit minimal, symptoms, she will be scheduled for catheterization now.    2.  She is on appropriate medications which I would not adjust.    3.  We will see her back to review cath films and findings and recommend further.  She will call for any issues.             Advance Care Planning   ACP discussion was held with the patient during this visit. Patient does not have an advance directive, declines further assistance.         Electronically signed by:

## 2024-06-18 ENCOUNTER — TELEPHONE (OUTPATIENT)
Dept: CARDIOLOGY | Facility: CLINIC | Age: 84
End: 2024-06-18
Payer: MEDICARE

## 2024-06-24 RX ORDER — FUROSEMIDE 20 MG/1
20 TABLET ORAL DAILY
Qty: 30 TABLET | Refills: 0 | Status: SHIPPED | OUTPATIENT
Start: 2024-06-24

## 2024-07-02 ENCOUNTER — LAB (OUTPATIENT)
Dept: LAB | Facility: HOSPITAL | Age: 84
End: 2024-07-02
Payer: MEDICARE

## 2024-07-02 DIAGNOSIS — R06.02 SHORTNESS OF BREATH: ICD-10-CM

## 2024-07-02 DIAGNOSIS — I25.10 CORONARY ARTERY DISEASE INVOLVING NATIVE CORONARY ARTERY OF NATIVE HEART WITHOUT ANGINA PECTORIS: ICD-10-CM

## 2024-07-02 DIAGNOSIS — I10 ESSENTIAL HYPERTENSION: ICD-10-CM

## 2024-07-02 LAB
ANION GAP SERPL CALCULATED.3IONS-SCNC: 10.8 MMOL/L (ref 5–15)
BASOPHILS # BLD AUTO: 0.05 10*3/MM3 (ref 0–0.2)
BASOPHILS NFR BLD AUTO: 0.6 % (ref 0–1.5)
BUN SERPL-MCNC: 26 MG/DL (ref 8–23)
BUN/CREAT SERPL: 26 (ref 7–25)
CALCIUM SPEC-SCNC: 9.5 MG/DL (ref 8.6–10.5)
CHLORIDE SERPL-SCNC: 104 MMOL/L (ref 98–107)
CO2 SERPL-SCNC: 25.2 MMOL/L (ref 22–29)
CREAT SERPL-MCNC: 1 MG/DL (ref 0.57–1)
DEPRECATED RDW RBC AUTO: 46.3 FL (ref 37–54)
EGFRCR SERPLBLD CKD-EPI 2021: 55.7 ML/MIN/1.73
EOSINOPHIL # BLD AUTO: 0.31 10*3/MM3 (ref 0–0.4)
EOSINOPHIL NFR BLD AUTO: 3.8 % (ref 0.3–6.2)
ERYTHROCYTE [DISTWIDTH] IN BLOOD BY AUTOMATED COUNT: 12.5 % (ref 12.3–15.4)
GLUCOSE SERPL-MCNC: 105 MG/DL (ref 65–99)
HCT VFR BLD AUTO: 40.5 % (ref 34–46.6)
HGB BLD-MCNC: 13.2 G/DL (ref 12–15.9)
IMM GRANULOCYTES # BLD AUTO: 0.03 10*3/MM3 (ref 0–0.05)
IMM GRANULOCYTES NFR BLD AUTO: 0.4 % (ref 0–0.5)
LYMPHOCYTES # BLD AUTO: 2.28 10*3/MM3 (ref 0.7–3.1)
LYMPHOCYTES NFR BLD AUTO: 27.8 % (ref 19.6–45.3)
MCH RBC QN AUTO: 32.6 PG (ref 26.6–33)
MCHC RBC AUTO-ENTMCNC: 32.6 G/DL (ref 31.5–35.7)
MCV RBC AUTO: 100 FL (ref 79–97)
MONOCYTES # BLD AUTO: 0.71 10*3/MM3 (ref 0.1–0.9)
MONOCYTES NFR BLD AUTO: 8.7 % (ref 5–12)
NEUTROPHILS NFR BLD AUTO: 4.82 10*3/MM3 (ref 1.7–7)
NEUTROPHILS NFR BLD AUTO: 58.7 % (ref 42.7–76)
NRBC BLD AUTO-RTO: 0 /100 WBC (ref 0–0.2)
PLATELET # BLD AUTO: 162 10*3/MM3 (ref 140–450)
PMV BLD AUTO: 14 FL (ref 6–12)
POTASSIUM SERPL-SCNC: 4.5 MMOL/L (ref 3.5–5.2)
RBC # BLD AUTO: 4.05 10*6/MM3 (ref 3.77–5.28)
SODIUM SERPL-SCNC: 140 MMOL/L (ref 136–145)
WBC NRBC COR # BLD AUTO: 8.2 10*3/MM3 (ref 3.4–10.8)

## 2024-07-02 PROCEDURE — 85025 COMPLETE CBC W/AUTO DIFF WBC: CPT

## 2024-07-02 PROCEDURE — 36415 COLL VENOUS BLD VENIPUNCTURE: CPT

## 2024-07-02 PROCEDURE — 80048 BASIC METABOLIC PNL TOTAL CA: CPT

## 2024-07-22 RX ORDER — FUROSEMIDE 20 MG/1
20 TABLET ORAL DAILY
Qty: 30 TABLET | Refills: 0 | Status: SHIPPED | OUTPATIENT
Start: 2024-07-22

## 2024-07-23 ENCOUNTER — TELEPHONE (OUTPATIENT)
Dept: CARDIOLOGY | Facility: CLINIC | Age: 84
End: 2024-07-23
Payer: MEDICARE

## 2024-07-23 NOTE — TELEPHONE ENCOUNTER
----- Message from Ender Holden sent at 7/22/2024 10:27 PM EDT -----  Glucose minimally elevated at 105.  Chemistry profile otherwise at baseline for patient.  Hematologic profile is largely benign.  ----- Message -----  From: Lab, Background User  Sent: 7/2/2024   2:31 PM EDT  To: ARACELI Garcia

## 2024-07-26 ENCOUNTER — OUTSIDE FACILITY SERVICE (OUTPATIENT)
Dept: CARDIOLOGY | Facility: CLINIC | Age: 84
End: 2024-07-26
Payer: MEDICARE

## 2024-07-26 PROCEDURE — 93458 L HRT ARTERY/VENTRICLE ANGIO: CPT | Performed by: INTERNAL MEDICINE

## 2024-08-08 ENCOUNTER — OFFICE VISIT (OUTPATIENT)
Dept: CARDIOLOGY | Facility: CLINIC | Age: 84
End: 2024-08-08
Payer: MEDICARE

## 2024-08-08 VITALS
HEIGHT: 63 IN | WEIGHT: 194.8 LBS | DIASTOLIC BLOOD PRESSURE: 74 MMHG | HEART RATE: 64 BPM | OXYGEN SATURATION: 98 % | BODY MASS INDEX: 34.52 KG/M2 | SYSTOLIC BLOOD PRESSURE: 132 MMHG

## 2024-08-08 DIAGNOSIS — I10 ESSENTIAL HYPERTENSION: ICD-10-CM

## 2024-08-08 DIAGNOSIS — I25.10 CORONARY ARTERY DISEASE INVOLVING NATIVE CORONARY ARTERY OF NATIVE HEART WITHOUT ANGINA PECTORIS: Primary | ICD-10-CM

## 2024-08-08 DIAGNOSIS — R06.02 SHORTNESS OF BREATH: ICD-10-CM

## 2024-08-08 PROCEDURE — 3075F SYST BP GE 130 - 139MM HG: CPT | Performed by: PHYSICIAN ASSISTANT

## 2024-08-08 PROCEDURE — 1160F RVW MEDS BY RX/DR IN RCRD: CPT | Performed by: PHYSICIAN ASSISTANT

## 2024-08-08 PROCEDURE — 99213 OFFICE O/P EST LOW 20 MIN: CPT | Performed by: PHYSICIAN ASSISTANT

## 2024-08-08 PROCEDURE — 3078F DIAST BP <80 MM HG: CPT | Performed by: PHYSICIAN ASSISTANT

## 2024-08-08 PROCEDURE — 1159F MED LIST DOCD IN RCRD: CPT | Performed by: PHYSICIAN ASSISTANT

## 2024-08-08 NOTE — PROGRESS NOTES
Problem list     Subjective   Kate Macias is a 84 y.o. female     Chief Complaint   Patient presents with    Coronary artery disease involving native coronary artery of     Follow up - Patient is following up from heart cath. Patient states she is doing well and feels great. Patient denies any chest pain, palpitations, leg swelling, or shortness of breath.    Problem list:  1.  Coronary artery disease.  1.1.  Left heart catheterization in April, 2017, reportedly suggesting an EF of 20 to 25% with 60% stenosis of the left circumflex.  Medical management was recommended.  1.2.  Repeat catheterization, 7/2024, suggesting only 30 to 50% stenosis of the circumflex with minor disease otherwise.  Medical management was recommended.  2.  History of dilated cardiomyopathy, EF initially at 20 to 25% as above.  2.1.  Ventriculography during catheterization in 2024 suggest an EF of at least 50%.  3.  Hypertension  4.  Dyslipidemia    HPI  The patient presents to the clinic today for follow-up.  For the most part, this patient has done well recently.  She had catheterization which indicated stable or actually improved stenosis involving the circumflex.  There was minor disease basically otherwise.  Ventriculography suggested now normalized systolic function with an EF of at least 50% or greater.  The patient feels well.  She reports only rare chest pain now.  Dyspnea and fatigue remain at baseline.  She denies failure or dysrhythmic symptoms.  Of note, cath site is stable without evidence of complication.    Current Outpatient Medications on File Prior to Visit   Medication Sig Dispense Refill    aspirin 81 MG EC tablet Take 1 tablet by mouth Daily.      atorvastatin (LIPITOR) 40 MG tablet Take 1 tablet by mouth Daily.      B Complex Vitamins (VITAMIN B COMPLEX) capsule capsule Take  by mouth Daily.      Fexofenadine HCl (ALLEGRA ALLERGY PO) Take  by mouth.      furosemide (LASIX) 20 MG tablet Take 1 tablet by mouth once  daily 30 tablet 0    latanoprost (XALATAN) 0.005 % ophthalmic solution Take As Directed. 1 drop in each eye, per day      MELATONIN PO Take 10 mg by mouth Every Night.      metoprolol succinate XL (TOPROL-XL) 50 MG 24 hr tablet Take 1 tablet by mouth Daily. 90 tablet 0    Multiple Vitamins-Minerals (MULTIVITAMIN WOMEN 50+ PO) Take  by mouth.      nitroglycerin (NITROSTAT) 0.4 MG SL tablet 1 under the tongue as needed for angina, may repeat q5mins for up three doses 25 tablet 2    potassium chloride 10 MEQ CR tablet Take 1 tablet by mouth Daily.      sacubitril-valsartan (ENTRESTO) 24-26 MG tablet Take 1 tablet by mouth 2 (Two) Times a Day. (start after being off Lisinopril for 3 days) 60 tablet 11    traMADol (ULTRAM) 50 MG tablet Take 1 tablet by mouth 2 (Two) Times a Day.       No current facility-administered medications on file prior to visit.       Lisinopril    Past Medical History:   Diagnosis Date    Abnormal ECG     Arthritis     CHF (congestive heart failure)     COVID-19 vaccine administered 2021    1st shot / 2nd vaccine 2021 [employer]. 2021    DDD (degenerative disc disease), cervical     DDD (degenerative disc disease), lumbar     DDD (degenerative disc disease), thoracic     Ejection fraction < 50%     Enlarged heart     Glaucoma     Hyperlipidemia     Hypertension     Left ventricular hypertrophy due to hypertensive disease     Myocardial infarction 2017 Heart Failure    Neck pain     reverse curvature of spine in neck        Social History     Socioeconomic History    Marital status:    Tobacco Use    Smoking status: Former     Current packs/day: 0.00     Average packs/day: 0.5 packs/day for 15.0 years (7.5 ttl pk-yrs)     Types: Cigarettes     Start date: 1955     Quit date: 1968     Years since quittin.6    Smokeless tobacco: Never   Vaping Use    Vaping status: Never Used   Substance and Sexual Activity    Alcohol use: Not Currently     Alcohol/week: 5.0  "standard drinks of alcohol     Comment: Alcohol is not allowed    Drug use: Never    Sexual activity: Not Currently     Partners: Male     Birth control/protection: Diaphragm, Tubal ligation, Birth control pill, Hysterectomy, Surgical       Family History   Problem Relation Age of Onset    COPD Mother     Macular degeneration Mother     Blindness Mother     Anemia Mother     No Known Problems Father     Heart attack Brother        Review of Systems   Constitutional: Negative.    HENT: Negative.     Eyes: Negative.    Respiratory: Negative.     Cardiovascular: Negative.    Gastrointestinal: Negative.    Endocrine: Negative.    Genitourinary: Negative.    Musculoskeletal:  Positive for back pain, neck pain and neck stiffness.   Skin: Negative.    Allergic/Immunologic: Negative.    Neurological:  Positive for light-headedness. Negative for dizziness, tremors, syncope, weakness, numbness and headaches.   Hematological:  Bruises/bleeds easily.   Psychiatric/Behavioral: Negative.         Objective   Vitals:    08/08/24 1009   BP: 132/74   BP Location: Left arm   Patient Position: Sitting   Cuff Size: Adult   Pulse: 64   SpO2: 98%   Weight: 88.4 kg (194 lb 12.8 oz)   Height: 160 cm (63\")      /74 (BP Location: Left arm, Patient Position: Sitting, Cuff Size: Adult)   Pulse 64   Ht 160 cm (63\")   Wt 88.4 kg (194 lb 12.8 oz)   LMP  (LMP Unknown)   SpO2 98%   BMI 34.51 kg/m²    Lab Results (most recent)       None          Physical Exam  Vitals and nursing note reviewed.   Constitutional:       General: She is not in acute distress.     Appearance: She is well-developed.   HENT:      Head: Normocephalic and atraumatic.   Eyes:      Conjunctiva/sclera: Conjunctivae normal.      Pupils: Pupils are equal, round, and reactive to light.   Neck:      Vascular: No JVD.      Trachea: No tracheal deviation.   Cardiovascular:      Rate and Rhythm: Normal rate and regular rhythm.      Heart sounds: Normal heart sounds. "   Pulmonary:      Effort: Pulmonary effort is normal.      Breath sounds: Normal breath sounds.   Abdominal:      General: Bowel sounds are normal. There is no distension.      Palpations: Abdomen is soft. There is no mass.      Tenderness: There is no abdominal tenderness. There is no guarding or rebound.   Musculoskeletal:         General: No tenderness or deformity. Normal range of motion.      Cervical back: Normal range of motion and neck supple.   Skin:     General: Skin is warm and dry.      Coloration: Skin is not pale.      Findings: No erythema or rash.   Neurological:      Mental Status: She is alert and oriented to person, place, and time.   Psychiatric:         Behavior: Behavior normal.         Thought Content: Thought content normal.         Judgment: Judgment normal.           Procedure   Procedures       Assessment & Plan      Diagnosis Plan   1. Coronary artery disease involving native coronary artery of native heart without angina pectoris        2. Shortness of breath        3. Essential hypertension          1.  The patient is doing well at this time.  Cath supported stable to possibly improved disease primarily involving the circumflex as above.  Medical management is indicated.  She is on appropriate medications which we will not adjust.  She will report back for any issues.    2.  She remains normotensive as well on current medical regimen.  She will monitor this and call for complications.    3.  Will stable clinical course and mostly stable cath findings, nothing further would be indicated.  We will continue to see her on 6 to 12-month intervals, sooner for complications.         Advance Care Planning   ACP discussion was held with the patient during this visit. Patient has an advance directive (not in EMR), copy requested.              BMI is >= 30 and <35. (Class 1 Obesity). The following options were offered after discussion;: referral to primary care             Electronically signed  by:

## 2024-08-19 RX ORDER — FUROSEMIDE 20 MG/1
20 TABLET ORAL DAILY
Qty: 30 TABLET | Refills: 0 | Status: SHIPPED | OUTPATIENT
Start: 2024-08-19

## 2024-09-16 RX ORDER — FUROSEMIDE 20 MG
20 TABLET ORAL DAILY
Qty: 30 TABLET | Refills: 0 | Status: SHIPPED | OUTPATIENT
Start: 2024-09-16

## 2024-10-14 RX ORDER — FUROSEMIDE 20 MG
20 TABLET ORAL DAILY
Qty: 30 TABLET | Refills: 0 | Status: SHIPPED | OUTPATIENT
Start: 2024-10-14

## 2024-11-08 RX ORDER — FUROSEMIDE 20 MG/1
20 TABLET ORAL DAILY
Qty: 30 TABLET | Refills: 0 | Status: SHIPPED | OUTPATIENT
Start: 2024-11-08

## 2024-12-04 RX ORDER — FUROSEMIDE 20 MG/1
20 TABLET ORAL DAILY
Qty: 90 TABLET | Refills: 3 | Status: SHIPPED | OUTPATIENT
Start: 2024-12-04

## 2024-12-09 RX ORDER — METOPROLOL SUCCINATE 50 MG/1
50 TABLET, EXTENDED RELEASE ORAL DAILY
Qty: 90 TABLET | Refills: 0 | Status: SHIPPED | OUTPATIENT
Start: 2024-12-09

## 2025-01-31 ENCOUNTER — TELEPHONE (OUTPATIENT)
Dept: CARDIOLOGY | Facility: CLINIC | Age: 85
End: 2025-01-31
Payer: MEDICARE

## 2025-01-31 RX ORDER — LISINOPRIL 20 MG/1
20 TABLET ORAL DAILY
Qty: 30 TABLET | Refills: 11 | Status: SHIPPED | OUTPATIENT
Start: 2025-01-31

## 2025-01-31 NOTE — TELEPHONE ENCOUNTER
Patient notified and medication sent to the pharmacy    Ender Holden PA Hedrick, [place]-Alice, MA  Caller: Unspecified (Today, 10:21 AM)  That will be fine.  She can resume previous strength.

## 2025-02-03 ENCOUNTER — TELEPHONE (OUTPATIENT)
Dept: CARDIOLOGY | Facility: CLINIC | Age: 85
End: 2025-02-03
Payer: MEDICARE

## 2025-02-03 NOTE — TELEPHONE ENCOUNTER
"Patient calling back today to check on status. Looks like Sancho routed this to Ender on 1-. Thanks, PH,LPN     Sancho Chan MA routed conversation to Ender Holden PA7 days ago     Kate Macias \"Jeannine\"  P Mge Card [facility] (supporting ARACELI Garcia)7 days ago       My Entresto has gone up from $47.00 to $169.17 and now I am told it is $618. 53.  I cannot possibly afford it.  I was told yesterday at [facility] that there is only $520.00 left in my copay account for the rest of 2025 for all my medications.  Using it for part of the copay for the Entresto will wipe it out.     Please put me back on Lisinopril.  I also have found that I am not allergic to Lisinopril and that it is my sinuses that are causing the small dry cough in the morning.     Please advise.  I thank you very much.   Jeannine Macias [phone]  [email or web address]        Per Ender CHARLES hold for two days prior to restarting lisinopril, patient reports is going to complete what entresto she has on hand and then will hold for 2 days then restart lisinopril.   "

## 2025-03-04 RX ORDER — METOPROLOL SUCCINATE 50 MG/1
50 TABLET, EXTENDED RELEASE ORAL DAILY
Qty: 90 TABLET | Refills: 0 | Status: SHIPPED | OUTPATIENT
Start: 2025-03-04

## 2025-05-13 ENCOUNTER — OFFICE VISIT (OUTPATIENT)
Dept: CARDIOLOGY | Facility: CLINIC | Age: 85
End: 2025-05-13
Payer: MEDICARE

## 2025-05-13 VITALS
SYSTOLIC BLOOD PRESSURE: 178 MMHG | HEART RATE: 66 BPM | HEIGHT: 63 IN | DIASTOLIC BLOOD PRESSURE: 104 MMHG | OXYGEN SATURATION: 97 % | WEIGHT: 195 LBS | BODY MASS INDEX: 34.55 KG/M2

## 2025-05-13 DIAGNOSIS — I25.10 CORONARY ARTERY DISEASE INVOLVING NATIVE CORONARY ARTERY OF NATIVE HEART WITHOUT ANGINA PECTORIS: Primary | ICD-10-CM

## 2025-05-13 DIAGNOSIS — I10 ESSENTIAL HYPERTENSION: ICD-10-CM

## 2025-05-13 DIAGNOSIS — R06.02 SHORTNESS OF BREATH: ICD-10-CM

## 2025-05-13 PROCEDURE — 3077F SYST BP >= 140 MM HG: CPT | Performed by: PHYSICIAN ASSISTANT

## 2025-05-13 PROCEDURE — 93000 ELECTROCARDIOGRAM COMPLETE: CPT | Performed by: PHYSICIAN ASSISTANT

## 2025-05-13 PROCEDURE — 1160F RVW MEDS BY RX/DR IN RCRD: CPT | Performed by: PHYSICIAN ASSISTANT

## 2025-05-13 PROCEDURE — 99213 OFFICE O/P EST LOW 20 MIN: CPT | Performed by: PHYSICIAN ASSISTANT

## 2025-05-13 PROCEDURE — 1159F MED LIST DOCD IN RCRD: CPT | Performed by: PHYSICIAN ASSISTANT

## 2025-05-13 PROCEDURE — 3080F DIAST BP >= 90 MM HG: CPT | Performed by: PHYSICIAN ASSISTANT

## 2025-05-13 RX ORDER — CLINDAMYCIN HYDROCHLORIDE 150 MG/1
150 CAPSULE ORAL 3 TIMES DAILY
COMMUNITY
Start: 2025-04-24

## 2025-05-13 NOTE — PROGRESS NOTES
Problem list     Subjective   Kate Macias is a 84 y.o. female     Chief Complaint   Patient presents with    Follow-up     9 month follow up     Coronary Artery Disease   Problem list:  1.  Coronary artery disease.  1.1.  Left heart catheterization in April, 2017, reportedly suggesting an EF of 20 to 25% with 60% stenosis of the left circumflex.  Medical management was recommended.  1.2.  Repeat catheterization, 7/2024, suggesting only 30 to 50% stenosis of the circumflex with minor disease otherwise.  Medical management was recommended.  2.  History of dilated cardiomyopathy, EF initially at 20 to 25% as above.  2.1.  Ventriculography during catheterization in 2024 suggest an EF of at least 50%.  3.  Hypertension  4.  Dyslipidemia       HPI    The patient presents in clinic today for follow-up.  Cardiovascular history is as outlined above.  For the most part she has done well since last evaluation.  She denies chest pain.  Dyspnea is at baseline.  She has no failure nor dysrhythmic issues.  She is hypertensive today but typically normotensive when checked at home.  She feels that her hypertensive status today is directly related to stress and anxiety.  She does do well on current medical regimen otherwise.  She has no further complaints.  Current Outpatient Medications on File Prior to Visit   Medication Sig Dispense Refill    aspirin 81 MG EC tablet Take 1 tablet by mouth Daily.      atorvastatin (LIPITOR) 40 MG tablet Take 1 tablet by mouth Daily.      B Complex Vitamins (VITAMIN B COMPLEX) capsule capsule Take  by mouth Daily.      clindamycin (CLEOCIN) 150 MG capsule Take 1 capsule by mouth 3 (Three) Times a Day.      diphenhydrAMINE-acetaminophen (TYLENOL PM)  MG tablet per tablet Take 1 tablet by mouth At Night As Needed for Sleep.      Fexofenadine HCl (ALLEGRA ALLERGY PO) Take  by mouth.      furosemide (LASIX) 20 MG tablet Take 1 tablet by mouth once daily 90 tablet 3    latanoprost (XALATAN)  0.005 % ophthalmic solution Take As Directed. 1 drop in each eye, per day      lisinopril (PRINIVIL,ZESTRIL) 20 MG tablet Take 1 tablet by mouth Daily. 30 tablet 11    metoprolol succinate XL (TOPROL-XL) 50 MG 24 hr tablet Take 1 tablet by mouth once daily 90 tablet 0    Multiple Vitamins-Minerals (MULTIVITAMIN WOMEN 50+ PO) Take  by mouth.      nitroglycerin (NITROSTAT) 0.4 MG SL tablet 1 under the tongue as needed for angina, may repeat q5mins for up three doses 25 tablet 2    potassium chloride 10 MEQ CR tablet Take 1 tablet by mouth Daily.      [DISCONTINUED] MELATONIN PO Take 10 mg by mouth Every Night.      [DISCONTINUED] sacubitril-valsartan (ENTRESTO) 24-26 MG tablet Take 1 tablet by mouth 2 (Two) Times a Day. (start after being off Lisinopril for 3 days) 60 tablet 11    [DISCONTINUED] traMADol (ULTRAM) 50 MG tablet Take 1 tablet by mouth 2 (Two) Times a Day.       No current facility-administered medications on file prior to visit.       Patient has no known allergies.    Past Medical History:   Diagnosis Date    Abnormal ECG     Arthritis     CHF (congestive heart failure)     COVID-19 vaccine administered 2021    1st shot / 2nd vaccine 2021 [employer]. 2021    DDD (degenerative disc disease), cervical     DDD (degenerative disc disease), lumbar     DDD (degenerative disc disease), thoracic     Ejection fraction < 50%     Enlarged heart     Glaucoma     Hyperlipidemia     Hypertension     Left ventricular hypertrophy due to hypertensive disease     Myocardial infarction 2017 Heart Failure    Neck pain     reverse curvature of spine in neck        Social History     Socioeconomic History    Marital status:    Tobacco Use    Smoking status: Former     Current packs/day: 0.00     Average packs/day: 0.5 packs/day for 15.0 years (7.5 ttl pk-yrs)     Types: Cigarettes     Start date: 1955     Quit date: 1968     Years since quittin.4    Smokeless tobacco: Never   Vaping Use  "   Vaping status: Never Used   Substance and Sexual Activity    Alcohol use: Not Currently     Alcohol/week: 3.0 standard drinks of alcohol     Types: 3 Glasses of wine per week     Comment: Alcohol is not allowed    Drug use: Never    Sexual activity: Not Currently     Partners: Male     Birth control/protection: Diaphragm, Tubal ligation, Birth control pill, Hysterectomy, Surgical       Family History   Problem Relation Age of Onset    COPD Mother     Macular degeneration Mother     Blindness Mother     Anemia Mother     No Known Problems Father     Heart attack Brother     Anemia Sister        Review of Systems   Constitutional: Negative.  Negative for chills, diaphoresis, fatigue and fever.   HENT:  Positive for hearing loss.    Eyes: Negative.  Negative for visual disturbance.   Respiratory: Negative.  Negative for apnea, cough, chest tightness, shortness of breath and wheezing.    Cardiovascular: Negative.  Negative for chest pain, palpitations and leg swelling.   Gastrointestinal: Negative.  Negative for abdominal pain and blood in stool.   Endocrine: Negative.    Genitourinary: Negative.  Negative for hematuria.   Musculoskeletal:  Positive for arthralgias, back pain, myalgias, neck pain and neck stiffness.   Skin: Negative.  Negative for rash and wound.   Allergic/Immunologic: Positive for environmental allergies (SEASONAL) and food allergies (LACTOSE).   Neurological: Negative.  Negative for dizziness, syncope, weakness, light-headedness, numbness and headaches.   Hematological:  Bruises/bleeds easily.   Psychiatric/Behavioral: Negative.  Negative for sleep disturbance.        Objective   Vitals:    05/13/25 1004   BP: (!) 178/104   Pulse: 66   SpO2: 97%   Weight: 88.5 kg (195 lb)   Height: 160 cm (63\")      BP (!) 178/104   Pulse 66   Ht 160 cm (63\")   Wt 88.5 kg (195 lb)   LMP  (LMP Unknown)   SpO2 97%   BMI 34.54 kg/m²    Lab Results (most recent)       None          Physical Exam  Vitals and " nursing note reviewed.   Constitutional:       General: She is not in acute distress.     Appearance: She is well-developed.   HENT:      Head: Normocephalic and atraumatic.   Eyes:      Conjunctiva/sclera: Conjunctivae normal.      Pupils: Pupils are equal, round, and reactive to light.   Neck:      Vascular: No JVD.      Trachea: No tracheal deviation.   Cardiovascular:      Rate and Rhythm: Normal rate and regular rhythm.      Heart sounds: Normal heart sounds.   Pulmonary:      Effort: Pulmonary effort is normal.      Breath sounds: Normal breath sounds.   Abdominal:      General: Bowel sounds are normal. There is no distension.      Palpations: Abdomen is soft. There is no mass.      Tenderness: There is no abdominal tenderness. There is no guarding or rebound.   Musculoskeletal:         General: No tenderness or deformity. Normal range of motion.      Cervical back: Normal range of motion and neck supple.   Skin:     General: Skin is warm and dry.      Coloration: Skin is not pale.      Findings: No erythema or rash.   Neurological:      Mental Status: She is alert and oriented to person, place, and time.   Psychiatric:         Behavior: Behavior normal.         Thought Content: Thought content normal.         Judgment: Judgment normal.           Procedure     ECG 12 Lead    Date/Time: 5/13/2025 10:52 AM  Performed by: Ender Holden PA    Authorized by: Ender Holden PA  Comparison: compared with previous ECG from 2/9/2024  Comparison to previous ECG: Sinus rhythm, IVCD with overall left bundle branch block morphology, no acute changes noted.             Assessment & Plan      Diagnosis Plan   1. Coronary artery disease involving native coronary artery of native heart without angina pectoris        2. Shortness of breath        3. Essential hypertension          1.  At this time, the patient appears to be doing well from a cardiovascular standpoint.  She currently denies angina.  Dyspnea is at  baseline.  She has no further cardiovascular symptoms or issues.    2.  Despite being hypertensive today, she typically is normotensive at home.  She will monitor this and call for ongoing hypertensive issues.  We will adjust accordingly.    3.  As the patient is doing well, no adjustments will be made in medications.  We will continue to see the patient on routine 6 to 12-month intervals.           Kate Macias  reports that she quit smoking about 57 years ago. Her smoking use included cigarettes. She started smoking about 70 years ago. She has a 7.5 pack-year smoking history. She has never used smokeless tobacco. Advance Care Planning   ACP discussion was held with the patient during this visit. Patient has an advance directive in EMR which is still valid.                     Electronically signed by:

## 2025-05-29 RX ORDER — METOPROLOL SUCCINATE 50 MG/1
50 TABLET, EXTENDED RELEASE ORAL DAILY
Qty: 90 TABLET | Refills: 3 | Status: SHIPPED | OUTPATIENT
Start: 2025-05-29

## 2025-08-07 ENCOUNTER — TELEPHONE (OUTPATIENT)
Dept: CARDIOLOGY | Facility: CLINIC | Age: 85
End: 2025-08-07
Payer: MEDICARE

## 2025-08-07 RX ORDER — OLMESARTAN MEDOXOMIL 40 MG/1
40 TABLET ORAL DAILY
Qty: 30 TABLET | Refills: 11 | Status: SHIPPED | OUTPATIENT
Start: 2025-08-07

## 2025-08-07 RX ORDER — CLONIDINE HYDROCHLORIDE 0.1 MG/1
0.1 TABLET ORAL 3 TIMES DAILY PRN
Qty: 30 TABLET | Refills: 11 | Status: SHIPPED | OUTPATIENT
Start: 2025-08-07

## 2025-08-13 ENCOUNTER — TELEPHONE (OUTPATIENT)
Dept: CARDIOLOGY | Facility: CLINIC | Age: 85
End: 2025-08-13
Payer: MEDICARE